# Patient Record
Sex: FEMALE | Race: WHITE | Employment: OTHER | URBAN - METROPOLITAN AREA
[De-identification: names, ages, dates, MRNs, and addresses within clinical notes are randomized per-mention and may not be internally consistent; named-entity substitution may affect disease eponyms.]

---

## 2022-01-01 ENCOUNTER — HOSPITAL ENCOUNTER (INPATIENT)
Age: 87
LOS: 2 days | DRG: 264 | End: 2022-12-05
Attending: EMERGENCY MEDICINE | Admitting: INTERNAL MEDICINE
Payer: MEDICARE

## 2022-01-01 ENCOUNTER — APPOINTMENT (OUTPATIENT)
Dept: GENERAL RADIOLOGY | Age: 87
DRG: 264 | End: 2022-01-01
Payer: MEDICARE

## 2022-01-01 VITALS
DIASTOLIC BLOOD PRESSURE: 37 MMHG | HEIGHT: 63 IN | OXYGEN SATURATION: 98 % | SYSTOLIC BLOOD PRESSURE: 82 MMHG | WEIGHT: 152.34 LBS | TEMPERATURE: 97.5 F | RESPIRATION RATE: 16 BRPM | HEART RATE: 76 BPM | BODY MASS INDEX: 26.99 KG/M2

## 2022-01-01 DIAGNOSIS — I50.9 CONGESTIVE HEART FAILURE, UNSPECIFIED HF CHRONICITY, UNSPECIFIED HEART FAILURE TYPE (HCC): ICD-10-CM

## 2022-01-01 DIAGNOSIS — R06.00 DYSPNEA, UNSPECIFIED TYPE: Primary | ICD-10-CM

## 2022-01-01 DIAGNOSIS — J81.0 ACUTE PULMONARY EDEMA (HCC): ICD-10-CM

## 2022-01-01 LAB
A/G RATIO: 1.1 (ref 1.1–2.2)
ALBUMIN SERPL-MCNC: 3.8 G/DL (ref 3.4–5)
ALP BLD-CCNC: 90 U/L (ref 40–129)
ALT SERPL-CCNC: <5 U/L (ref 10–40)
ANION GAP SERPL CALCULATED.3IONS-SCNC: 5 MMOL/L (ref 3–16)
ANION GAP SERPL CALCULATED.3IONS-SCNC: 7 MMOL/L (ref 3–16)
AST SERPL-CCNC: 14 U/L (ref 15–37)
BASOPHILS ABSOLUTE: 0 K/UL (ref 0–0.2)
BASOPHILS RELATIVE PERCENT: 0.3 %
BILIRUB SERPL-MCNC: 0.4 MG/DL (ref 0–1)
BUN BLDV-MCNC: 15 MG/DL (ref 7–20)
BUN BLDV-MCNC: 17 MG/DL (ref 7–20)
CALCIUM SERPL-MCNC: 8.6 MG/DL (ref 8.3–10.6)
CALCIUM SERPL-MCNC: 8.9 MG/DL (ref 8.3–10.6)
CHLORIDE BLD-SCNC: 95 MMOL/L (ref 99–110)
CHLORIDE BLD-SCNC: 97 MMOL/L (ref 99–110)
CHOLESTEROL, TOTAL: 153 MG/DL (ref 0–199)
CO2: 33 MMOL/L (ref 21–32)
CO2: 37 MMOL/L (ref 21–32)
CREAT SERPL-MCNC: 0.8 MG/DL (ref 0.6–1.2)
CREAT SERPL-MCNC: 0.9 MG/DL (ref 0.6–1.2)
EKG ATRIAL RATE: 70 BPM
EKG DIAGNOSIS: NORMAL
EKG P AXIS: 65 DEGREES
EKG P-R INTERVAL: 176 MS
EKG Q-T INTERVAL: 408 MS
EKG QRS DURATION: 82 MS
EKG QTC CALCULATION (BAZETT): 440 MS
EKG R AXIS: 76 DEGREES
EKG T AXIS: 63 DEGREES
EKG VENTRICULAR RATE: 70 BPM
EOSINOPHILS ABSOLUTE: 0 K/UL (ref 0–0.6)
EOSINOPHILS RELATIVE PERCENT: 0.4 %
GFR SERPL CREATININE-BSD FRML MDRD: 60 ML/MIN/{1.73_M2}
GFR SERPL CREATININE-BSD FRML MDRD: >60 ML/MIN/{1.73_M2}
GLUCOSE BLD-MCNC: 107 MG/DL (ref 70–99)
GLUCOSE BLD-MCNC: 110 MG/DL (ref 70–99)
HCT VFR BLD CALC: 37 % (ref 36–48)
HDLC SERPL-MCNC: 53 MG/DL (ref 40–60)
HEMOGLOBIN: 11.7 G/DL (ref 12–16)
LACTIC ACID, SEPSIS: 0.9 MMOL/L (ref 0.4–1.9)
LDL CHOLESTEROL CALCULATED: 88 MG/DL
LYMPHOCYTES ABSOLUTE: 0.9 K/UL (ref 1–5.1)
LYMPHOCYTES RELATIVE PERCENT: 8.2 %
MAGNESIUM: 1.9 MG/DL (ref 1.8–2.4)
MCH RBC QN AUTO: 28.6 PG (ref 26–34)
MCHC RBC AUTO-ENTMCNC: 31.5 G/DL (ref 31–36)
MCV RBC AUTO: 90.8 FL (ref 80–100)
MONOCYTES ABSOLUTE: 0.9 K/UL (ref 0–1.3)
MONOCYTES RELATIVE PERCENT: 8.4 %
NEUTROPHILS ABSOLUTE: 9.2 K/UL (ref 1.7–7.7)
NEUTROPHILS RELATIVE PERCENT: 82.7 %
PDW BLD-RTO: 14.5 % (ref 12.4–15.4)
PLATELET # BLD: 311 K/UL (ref 135–450)
PMV BLD AUTO: 6.5 FL (ref 5–10.5)
POTASSIUM SERPL-SCNC: 3.9 MMOL/L (ref 3.5–5.1)
POTASSIUM SERPL-SCNC: 4.3 MMOL/L (ref 3.5–5.1)
PRO-BNP: 4642 PG/ML (ref 0–449)
PROCALCITONIN: 0.08 NG/ML (ref 0–0.15)
RAPID INFLUENZA  B AGN: NEGATIVE
RAPID INFLUENZA A AGN: NEGATIVE
RBC # BLD: 4.08 M/UL (ref 4–5.2)
SARS-COV-2, NAAT: NOT DETECTED
SODIUM BLD-SCNC: 135 MMOL/L (ref 136–145)
SODIUM BLD-SCNC: 139 MMOL/L (ref 136–145)
TOTAL PROTEIN: 7.2 G/DL (ref 6.4–8.2)
TRIGL SERPL-MCNC: 60 MG/DL (ref 0–150)
TROPONIN: <0.01 NG/ML
VLDLC SERPL CALC-MCNC: 12 MG/DL
WBC # BLD: 11.1 K/UL (ref 4–11)

## 2022-01-01 PROCEDURE — 87635 SARS-COV-2 COVID-19 AMP PRB: CPT

## 2022-01-01 PROCEDURE — 0JBQ0ZZ EXCISION OF RIGHT FOOT SUBCUTANEOUS TISSUE AND FASCIA, OPEN APPROACH: ICD-10-PCS | Performed by: PODIATRIST

## 2022-01-01 PROCEDURE — 2500000003 HC RX 250 WO HCPCS: Performed by: NURSE PRACTITIONER

## 2022-01-01 PROCEDURE — 94761 N-INVAS EAR/PLS OXIMETRY MLT: CPT

## 2022-01-01 PROCEDURE — 80061 LIPID PANEL: CPT

## 2022-01-01 PROCEDURE — 6360000002 HC RX W HCPCS: Performed by: INTERNAL MEDICINE

## 2022-01-01 PROCEDURE — 99222 1ST HOSP IP/OBS MODERATE 55: CPT | Performed by: INTERNAL MEDICINE

## 2022-01-01 PROCEDURE — 96374 THER/PROPH/DIAG INJ IV PUSH: CPT

## 2022-01-01 PROCEDURE — 85025 COMPLETE CBC W/AUTO DIFF WBC: CPT

## 2022-01-01 PROCEDURE — 1200000000 HC SEMI PRIVATE

## 2022-01-01 PROCEDURE — 84145 PROCALCITONIN (PCT): CPT

## 2022-01-01 PROCEDURE — 6370000000 HC RX 637 (ALT 250 FOR IP): Performed by: INTERNAL MEDICINE

## 2022-01-01 PROCEDURE — 94640 AIRWAY INHALATION TREATMENT: CPT

## 2022-01-01 PROCEDURE — 80048 BASIC METABOLIC PNL TOTAL CA: CPT

## 2022-01-01 PROCEDURE — 0HBMXZZ EXCISION OF RIGHT FOOT SKIN, EXTERNAL APPROACH: ICD-10-PCS | Performed by: PODIATRIST

## 2022-01-01 PROCEDURE — 6360000002 HC RX W HCPCS: Performed by: EMERGENCY MEDICINE

## 2022-01-01 PROCEDURE — 92610 EVALUATE SWALLOWING FUNCTION: CPT

## 2022-01-01 PROCEDURE — 84484 ASSAY OF TROPONIN QUANT: CPT

## 2022-01-01 PROCEDURE — 99285 EMERGENCY DEPT VISIT HI MDM: CPT

## 2022-01-01 PROCEDURE — 93010 ELECTROCARDIOGRAM REPORT: CPT | Performed by: INTERNAL MEDICINE

## 2022-01-01 PROCEDURE — 87804 INFLUENZA ASSAY W/OPTIC: CPT

## 2022-01-01 PROCEDURE — 83880 ASSAY OF NATRIURETIC PEPTIDE: CPT

## 2022-01-01 PROCEDURE — 80053 COMPREHEN METABOLIC PANEL: CPT

## 2022-01-01 PROCEDURE — 71046 X-RAY EXAM CHEST 2 VIEWS: CPT

## 2022-01-01 PROCEDURE — 83605 ASSAY OF LACTIC ACID: CPT

## 2022-01-01 PROCEDURE — 6370000000 HC RX 637 (ALT 250 FOR IP): Performed by: EMERGENCY MEDICINE

## 2022-01-01 PROCEDURE — 2700000000 HC OXYGEN THERAPY PER DAY

## 2022-01-01 PROCEDURE — 36415 COLL VENOUS BLD VENIPUNCTURE: CPT

## 2022-01-01 PROCEDURE — 2580000003 HC RX 258: Performed by: INTERNAL MEDICINE

## 2022-01-01 PROCEDURE — 83735 ASSAY OF MAGNESIUM: CPT

## 2022-01-01 PROCEDURE — 93005 ELECTROCARDIOGRAM TRACING: CPT | Performed by: EMERGENCY MEDICINE

## 2022-01-01 PROCEDURE — 87040 BLOOD CULTURE FOR BACTERIA: CPT

## 2022-01-01 RX ORDER — IPRATROPIUM BROMIDE AND ALBUTEROL SULFATE 2.5; .5 MG/3ML; MG/3ML
1 SOLUTION RESPIRATORY (INHALATION) ONCE
Status: COMPLETED | OUTPATIENT
Start: 2022-01-01 | End: 2022-01-01

## 2022-01-01 RX ORDER — SODIUM CHLORIDE 0.9 % (FLUSH) 0.9 %
5-40 SYRINGE (ML) INJECTION EVERY 12 HOURS SCHEDULED
Status: DISCONTINUED | OUTPATIENT
Start: 2022-01-01 | End: 2022-01-01 | Stop reason: HOSPADM

## 2022-01-01 RX ORDER — MIRTAZAPINE 15 MG/1
15 TABLET, FILM COATED ORAL NIGHTLY
COMMUNITY

## 2022-01-01 RX ORDER — POTASSIUM CHLORIDE 20 MEQ/1
20 TABLET, EXTENDED RELEASE ORAL DAILY
COMMUNITY

## 2022-01-01 RX ORDER — ALBUTEROL SULFATE 2.5 MG/3ML
1.25 SOLUTION RESPIRATORY (INHALATION) EVERY 4 HOURS PRN
Status: DISCONTINUED | OUTPATIENT
Start: 2022-01-01 | End: 2022-01-01 | Stop reason: HOSPADM

## 2022-01-01 RX ORDER — OLANZAPINE 10 MG/1
5 INJECTION, POWDER, LYOPHILIZED, FOR SOLUTION INTRAMUSCULAR ONCE
Status: COMPLETED | OUTPATIENT
Start: 2022-01-01 | End: 2022-01-01

## 2022-01-01 RX ORDER — VALACYCLOVIR HYDROCHLORIDE 1 G/1
1000 TABLET, FILM COATED ORAL 3 TIMES DAILY
COMMUNITY
Start: 2022-01-01 | End: 2022-12-08

## 2022-01-01 RX ORDER — HYDROXYCHLOROQUINE SULFATE 200 MG/1
200 TABLET, FILM COATED ORAL DAILY
Status: DISCONTINUED | OUTPATIENT
Start: 2022-01-01 | End: 2022-01-01 | Stop reason: HOSPADM

## 2022-01-01 RX ORDER — SODIUM CHLORIDE 0.9 % (FLUSH) 0.9 %
5-40 SYRINGE (ML) INJECTION PRN
Status: DISCONTINUED | OUTPATIENT
Start: 2022-01-01 | End: 2022-01-01 | Stop reason: HOSPADM

## 2022-01-01 RX ORDER — DORZOLAMIDE HYDROCHLORIDE AND TIMOLOL MALEATE 20; 5 MG/ML; MG/ML
1 SOLUTION/ DROPS OPHTHALMIC 2 TIMES DAILY
Status: DISCONTINUED | OUTPATIENT
Start: 2022-01-01 | End: 2022-01-01 | Stop reason: HOSPADM

## 2022-01-01 RX ORDER — MIRTAZAPINE 15 MG/1
15 TABLET, FILM COATED ORAL NIGHTLY
Status: DISCONTINUED | OUTPATIENT
Start: 2022-01-01 | End: 2022-01-01 | Stop reason: HOSPADM

## 2022-01-01 RX ORDER — PREDNISOLONE ACETATE 10 MG/ML
1 SUSPENSION/ DROPS OPHTHALMIC 4 TIMES DAILY
Status: DISCONTINUED | OUTPATIENT
Start: 2022-01-01 | End: 2022-01-01 | Stop reason: HOSPADM

## 2022-01-01 RX ORDER — OMEPRAZOLE 20 MG/1
20 CAPSULE, DELAYED RELEASE ORAL 2 TIMES DAILY
COMMUNITY

## 2022-01-01 RX ORDER — BRIMONIDINE TARTRATE 2 MG/ML
1 SOLUTION/ DROPS OPHTHALMIC 3 TIMES DAILY
Status: DISCONTINUED | OUTPATIENT
Start: 2022-01-01 | End: 2022-01-01 | Stop reason: HOSPADM

## 2022-01-01 RX ORDER — ERYTHROMYCIN 5 MG/G
OINTMENT OPHTHALMIC 2 TIMES DAILY
COMMUNITY

## 2022-01-01 RX ORDER — OFLOXACIN 3 MG/ML
1 SOLUTION/ DROPS OPHTHALMIC 4 TIMES DAILY
Status: DISCONTINUED | OUTPATIENT
Start: 2022-01-01 | End: 2022-01-01 | Stop reason: CLARIF

## 2022-01-01 RX ORDER — ONDANSETRON 4 MG/1
4 TABLET, ORALLY DISINTEGRATING ORAL EVERY 8 HOURS PRN
Status: DISCONTINUED | OUTPATIENT
Start: 2022-01-01 | End: 2022-01-01 | Stop reason: HOSPADM

## 2022-01-01 RX ORDER — SODIUM CHLORIDE 9 MG/ML
INJECTION, SOLUTION INTRAVENOUS PRN
Status: DISCONTINUED | OUTPATIENT
Start: 2022-01-01 | End: 2022-01-01 | Stop reason: HOSPADM

## 2022-01-01 RX ORDER — OFLOXACIN 3 MG/ML
1 SOLUTION/ DROPS OPHTHALMIC 4 TIMES DAILY
COMMUNITY

## 2022-01-01 RX ORDER — VALACYCLOVIR HYDROCHLORIDE 500 MG/1
1000 TABLET, FILM COATED ORAL 2 TIMES DAILY
Status: DISCONTINUED | OUTPATIENT
Start: 2022-01-01 | End: 2022-01-01 | Stop reason: HOSPADM

## 2022-01-01 RX ORDER — CALCIUM CARBONATE 500(1250)
500 TABLET ORAL DAILY
Status: DISCONTINUED | OUTPATIENT
Start: 2022-01-01 | End: 2022-01-01 | Stop reason: HOSPADM

## 2022-01-01 RX ORDER — BUSPIRONE HYDROCHLORIDE 7.5 MG/1
7.5 TABLET ORAL 3 TIMES DAILY
COMMUNITY

## 2022-01-01 RX ORDER — FUROSEMIDE 10 MG/ML
40 INJECTION INTRAMUSCULAR; INTRAVENOUS ONCE
Status: COMPLETED | OUTPATIENT
Start: 2022-01-01 | End: 2022-01-01

## 2022-01-01 RX ORDER — FUROSEMIDE 10 MG/ML
40 INJECTION INTRAMUSCULAR; INTRAVENOUS DAILY
Status: DISCONTINUED | OUTPATIENT
Start: 2022-01-01 | End: 2022-01-01

## 2022-01-01 RX ORDER — ACETAMINOPHEN 650 MG/1
650 SUPPOSITORY RECTAL EVERY 6 HOURS PRN
Status: DISCONTINUED | OUTPATIENT
Start: 2022-01-01 | End: 2022-01-01 | Stop reason: HOSPADM

## 2022-01-01 RX ORDER — FUROSEMIDE 20 MG/1
20 TABLET ORAL EVERY OTHER DAY
COMMUNITY

## 2022-01-01 RX ORDER — ALBUTEROL SULFATE 90 UG/1
2 AEROSOL, METERED RESPIRATORY (INHALATION) 2 TIMES DAILY
Status: DISCONTINUED | OUTPATIENT
Start: 2022-01-01 | End: 2022-01-01 | Stop reason: HOSPADM

## 2022-01-01 RX ORDER — POLYETHYLENE GLYCOL 3350 17 G/17G
17 POWDER, FOR SOLUTION ORAL DAILY PRN
Status: DISCONTINUED | OUTPATIENT
Start: 2022-01-01 | End: 2022-01-01 | Stop reason: HOSPADM

## 2022-01-01 RX ORDER — ASPIRIN 81 MG/1
81 TABLET, CHEWABLE ORAL DAILY
Status: DISCONTINUED | OUTPATIENT
Start: 2022-01-01 | End: 2022-01-01 | Stop reason: HOSPADM

## 2022-01-01 RX ORDER — PREDNISOLONE ACETATE 10 MG/ML
1 SUSPENSION/ DROPS OPHTHALMIC 4 TIMES DAILY
COMMUNITY

## 2022-01-01 RX ORDER — BUSPIRONE HYDROCHLORIDE 5 MG/1
5 TABLET ORAL 2 TIMES DAILY
Status: CANCELLED | OUTPATIENT
Start: 2022-01-01

## 2022-01-01 RX ORDER — IPRATROPIUM BROMIDE AND ALBUTEROL SULFATE 2.5; .5 MG/3ML; MG/3ML
1 SOLUTION RESPIRATORY (INHALATION)
Status: DISCONTINUED | OUTPATIENT
Start: 2022-01-01 | End: 2022-01-01

## 2022-01-01 RX ORDER — ONDANSETRON 2 MG/ML
4 INJECTION INTRAMUSCULAR; INTRAVENOUS EVERY 6 HOURS PRN
Status: DISCONTINUED | OUTPATIENT
Start: 2022-01-01 | End: 2022-01-01 | Stop reason: HOSPADM

## 2022-01-01 RX ORDER — FUROSEMIDE 10 MG/ML
40 INJECTION INTRAMUSCULAR; INTRAVENOUS 2 TIMES DAILY
Status: DISCONTINUED | OUTPATIENT
Start: 2022-01-01 | End: 2022-01-01 | Stop reason: HOSPADM

## 2022-01-01 RX ORDER — BRIMONIDINE TARTRATE 0.15 %
1 DROPS OPHTHALMIC (EYE) EVERY 8 HOURS
COMMUNITY

## 2022-01-01 RX ORDER — PANTOPRAZOLE SODIUM 40 MG/1
40 TABLET, DELAYED RELEASE ORAL
Status: DISCONTINUED | OUTPATIENT
Start: 2022-01-01 | End: 2022-01-01 | Stop reason: HOSPADM

## 2022-01-01 RX ORDER — BUSPIRONE HYDROCHLORIDE 5 MG/1
7.5 TABLET ORAL 3 TIMES DAILY
Status: DISCONTINUED | OUTPATIENT
Start: 2022-01-01 | End: 2022-01-01 | Stop reason: HOSPADM

## 2022-01-01 RX ORDER — ACETAMINOPHEN 325 MG/1
650 TABLET ORAL EVERY 6 HOURS PRN
Status: DISCONTINUED | OUTPATIENT
Start: 2022-01-01 | End: 2022-01-01 | Stop reason: HOSPADM

## 2022-01-01 RX ORDER — ERYTHROMYCIN 5 MG/G
OINTMENT OPHTHALMIC 2 TIMES DAILY
Status: DISCONTINUED | OUTPATIENT
Start: 2022-01-01 | End: 2022-01-01 | Stop reason: HOSPADM

## 2022-01-01 RX ORDER — DORZOLAMIDE HYDROCHLORIDE AND TIMOLOL MALEATE 20; 5 MG/ML; MG/ML
1 SOLUTION/ DROPS OPHTHALMIC 2 TIMES DAILY
COMMUNITY

## 2022-01-01 RX ORDER — CIPROFLOXACIN HYDROCHLORIDE 3.5 MG/ML
1 SOLUTION/ DROPS TOPICAL
Status: DISCONTINUED | OUTPATIENT
Start: 2022-01-01 | End: 2022-01-01 | Stop reason: HOSPADM

## 2022-01-01 RX ADMIN — Medication 2 PUFF: at 07:28

## 2022-01-01 RX ADMIN — VALACYCLOVIR HYDROCHLORIDE 1000 MG: 500 TABLET, FILM COATED ORAL at 21:19

## 2022-01-01 RX ADMIN — FUROSEMIDE 40 MG: 10 INJECTION, SOLUTION INTRAMUSCULAR; INTRAVENOUS at 13:18

## 2022-01-01 RX ADMIN — Medication 2 PUFF: at 19:20

## 2022-01-01 RX ADMIN — DORZOLAMIDE HYDROCHLORIDE AND TIMOLOL MALEATE 1 DROP: 20; 5 SOLUTION/ DROPS OPHTHALMIC at 21:00

## 2022-01-01 RX ADMIN — Medication 2 PUFF: at 20:50

## 2022-01-01 RX ADMIN — METOPROLOL TARTRATE 25 MG: 25 TABLET, FILM COATED ORAL at 21:19

## 2022-01-01 RX ADMIN — ONDANSETRON 4 MG: 2 INJECTION INTRAMUSCULAR; INTRAVENOUS at 18:15

## 2022-01-01 RX ADMIN — BUSPIRONE HYDROCHLORIDE 7.5 MG: 5 TABLET ORAL at 13:27

## 2022-01-01 RX ADMIN — ERYTHROMYCIN: 5 OINTMENT OPHTHALMIC at 21:08

## 2022-01-01 RX ADMIN — ERYTHROMYCIN: 5 OINTMENT OPHTHALMIC at 21:24

## 2022-01-01 RX ADMIN — BRIMONIDINE TARTRATE 1 DROP: 2 SOLUTION OPHTHALMIC at 18:00

## 2022-01-01 RX ADMIN — ACETAMINOPHEN 650 MG: 325 TABLET ORAL at 13:30

## 2022-01-01 RX ADMIN — BRIMONIDINE TARTRATE 1 DROP: 2 SOLUTION OPHTHALMIC at 08:32

## 2022-01-01 RX ADMIN — APIXABAN 5 MG: 5 TABLET, FILM COATED ORAL at 21:19

## 2022-01-01 RX ADMIN — METOPROLOL TARTRATE 25 MG: 25 TABLET, FILM COATED ORAL at 08:33

## 2022-01-01 RX ADMIN — CIPROFLOXACIN 1 DROP: 3 SOLUTION OPHTHALMIC at 18:01

## 2022-01-01 RX ADMIN — BUSPIRONE HYDROCHLORIDE 7.5 MG: 5 TABLET ORAL at 20:54

## 2022-01-01 RX ADMIN — BRIMONIDINE TARTRATE 1 DROP: 2 SOLUTION OPHTHALMIC at 21:21

## 2022-01-01 RX ADMIN — IPRATROPIUM BROMIDE AND ALBUTEROL SULFATE 1 AMPULE: 2.5; .5 SOLUTION RESPIRATORY (INHALATION) at 12:12

## 2022-01-01 RX ADMIN — BRIMONIDINE TARTRATE 1 DROP: 2 SOLUTION OPHTHALMIC at 14:30

## 2022-01-01 RX ADMIN — VALACYCLOVIR HYDROCHLORIDE 1000 MG: 500 TABLET, FILM COATED ORAL at 09:17

## 2022-01-01 RX ADMIN — DORZOLAMIDE HYDROCHLORIDE AND TIMOLOL MALEATE 1 DROP: 20; 5 SOLUTION/ DROPS OPHTHALMIC at 08:42

## 2022-01-01 RX ADMIN — PREDNISOLONE ACETATE 1 DROP: 10 SUSPENSION/ DROPS OPHTHALMIC at 21:17

## 2022-01-01 RX ADMIN — CIPROFLOXACIN 1 DROP: 3 SOLUTION OPHTHALMIC at 21:41

## 2022-01-01 RX ADMIN — PANTOPRAZOLE SODIUM 40 MG: 40 TABLET, DELAYED RELEASE ORAL at 16:39

## 2022-01-01 RX ADMIN — PREDNISOLONE ACETATE 1 DROP: 10 SUSPENSION/ DROPS OPHTHALMIC at 17:25

## 2022-01-01 RX ADMIN — Medication 10 ML: at 20:55

## 2022-01-01 RX ADMIN — CIPROFLOXACIN 1 DROP: 3 SOLUTION OPHTHALMIC at 10:20

## 2022-01-01 RX ADMIN — PREDNISOLONE ACETATE 1 DROP: 10 SUSPENSION/ DROPS OPHTHALMIC at 13:23

## 2022-01-01 RX ADMIN — CIPROFLOXACIN 1 DROP: 3 SOLUTION OPHTHALMIC at 20:56

## 2022-01-01 RX ADMIN — ERYTHROMYCIN: 5 OINTMENT OPHTHALMIC at 10:20

## 2022-01-01 RX ADMIN — APIXABAN 5 MG: 5 TABLET, FILM COATED ORAL at 20:54

## 2022-01-01 RX ADMIN — PANTOPRAZOLE SODIUM 40 MG: 40 TABLET, DELAYED RELEASE ORAL at 06:10

## 2022-01-01 RX ADMIN — ASPIRIN 81 MG: 81 TABLET, CHEWABLE ORAL at 08:33

## 2022-01-01 RX ADMIN — CIPROFLOXACIN 1 DROP: 3 SOLUTION OPHTHALMIC at 06:10

## 2022-01-01 RX ADMIN — BUSPIRONE HYDROCHLORIDE 7.5 MG: 5 TABLET ORAL at 21:18

## 2022-01-01 RX ADMIN — OLANZAPINE 5 MG: 10 INJECTION, POWDER, FOR SOLUTION INTRAMUSCULAR at 02:29

## 2022-01-01 RX ADMIN — CALCIUM 500 MG: 500 TABLET ORAL at 08:33

## 2022-01-01 RX ADMIN — METOPROLOL TARTRATE 25 MG: 25 TABLET, FILM COATED ORAL at 20:54

## 2022-01-01 RX ADMIN — PREDNISOLONE ACETATE 1 DROP: 10 SUSPENSION/ DROPS OPHTHALMIC at 09:17

## 2022-01-01 RX ADMIN — FUROSEMIDE 40 MG: 10 INJECTION, SOLUTION INTRAMUSCULAR; INTRAVENOUS at 17:22

## 2022-01-01 RX ADMIN — VALACYCLOVIR HYDROCHLORIDE 1000 MG: 500 TABLET, FILM COATED ORAL at 21:06

## 2022-01-01 RX ADMIN — Medication 10 ML: at 21:23

## 2022-01-01 RX ADMIN — CIPROFLOXACIN 1 DROP: 3 SOLUTION OPHTHALMIC at 13:23

## 2022-01-01 RX ADMIN — Medication 10 ML: at 08:45

## 2022-01-01 RX ADMIN — Medication 2 PUFF: at 19:16

## 2022-01-01 RX ADMIN — BUSPIRONE HYDROCHLORIDE 7.5 MG: 5 TABLET ORAL at 16:39

## 2022-01-01 RX ADMIN — DORZOLAMIDE HYDROCHLORIDE AND TIMOLOL MALEATE 1 DROP: 20; 5 SOLUTION/ DROPS OPHTHALMIC at 21:08

## 2022-01-01 RX ADMIN — MIRTAZAPINE 15 MG: 15 TABLET, FILM COATED ORAL at 20:55

## 2022-01-01 RX ADMIN — CIPROFLOXACIN 1 DROP: 3 SOLUTION OPHTHALMIC at 18:09

## 2022-01-01 RX ADMIN — PANTOPRAZOLE SODIUM 40 MG: 40 TABLET, DELAYED RELEASE ORAL at 16:10

## 2022-01-01 RX ADMIN — FUROSEMIDE 40 MG: 10 INJECTION, SOLUTION INTRAMUSCULAR; INTRAVENOUS at 08:33

## 2022-01-01 RX ADMIN — APIXABAN 5 MG: 5 TABLET, FILM COATED ORAL at 08:33

## 2022-01-01 RX ADMIN — HYDROXYCHLOROQUINE SULFATE 200 MG: 200 TABLET ORAL at 08:33

## 2022-01-01 RX ADMIN — BUSPIRONE HYDROCHLORIDE 7.5 MG: 5 TABLET ORAL at 08:33

## 2022-01-01 RX ADMIN — Medication 2 PUFF: at 20:52

## 2022-01-01 RX ADMIN — MIRTAZAPINE 15 MG: 15 TABLET, FILM COATED ORAL at 21:19

## 2022-01-01 RX ADMIN — TIOTROPIUM BROMIDE INHALATION SPRAY 2 PUFF: 3.12 SPRAY, METERED RESPIRATORY (INHALATION) at 07:28

## 2022-01-01 RX ADMIN — BRIMONIDINE TARTRATE 1 DROP: 2 SOLUTION OPHTHALMIC at 20:56

## 2022-01-01 ASSESSMENT — ENCOUNTER SYMPTOMS
RECTAL PAIN: 0
ABDOMINAL DISTENTION: 0
APNEA: 0
EYE DISCHARGE: 0
SHORTNESS OF BREATH: 1
VOICE CHANGE: 0
COUGH: 0
RHINORRHEA: 0
PHOTOPHOBIA: 0
BLOOD IN STOOL: 0
NAUSEA: 0
VOMITING: 0
ABDOMINAL PAIN: 0
COLOR CHANGE: 0
CONSTIPATION: 0
CHEST TIGHTNESS: 0
BACK PAIN: 0
SINUS PRESSURE: 0
WHEEZING: 0
DIARRHEA: 0
TROUBLE SWALLOWING: 0
EYE PAIN: 0
EYE REDNESS: 0
SORE THROAT: 0
STRIDOR: 0

## 2022-04-22 ENCOUNTER — HOSPITAL ENCOUNTER (INPATIENT)
Age: 87
LOS: 2 days | Discharge: HOME HEALTH | DRG: 684 | End: 2022-04-25
Attending: EMERGENCY MEDICINE | Admitting: INTERNAL MEDICINE
Payer: MEDICARE

## 2022-04-22 ENCOUNTER — APPOINTMENT (OUTPATIENT)
Dept: GENERAL RADIOLOGY | Age: 87
DRG: 684 | End: 2022-04-22
Payer: MEDICARE

## 2022-04-22 DIAGNOSIS — J96.01 ACUTE RESPIRATORY FAILURE WITH HYPOXIA (HCC): Primary | ICD-10-CM

## 2022-04-22 DIAGNOSIS — J90 PLEURAL EFFUSION: ICD-10-CM

## 2022-04-22 DIAGNOSIS — R79.89 ELEVATED D-DIMER: ICD-10-CM

## 2022-04-22 DIAGNOSIS — N17.9 AKI (ACUTE KIDNEY INJURY) (HCC): ICD-10-CM

## 2022-04-22 DIAGNOSIS — S70.02XA CONTUSION OF LEFT HIP, INITIAL ENCOUNTER: ICD-10-CM

## 2022-04-22 LAB
A/G RATIO: 1.6 (ref 1.1–2.2)
ALBUMIN SERPL-MCNC: 3.9 G/DL (ref 3.4–5)
ALP BLD-CCNC: 69 U/L (ref 40–129)
ALT SERPL-CCNC: 9 U/L (ref 10–40)
ANION GAP SERPL CALCULATED.3IONS-SCNC: 11 MMOL/L (ref 3–16)
AST SERPL-CCNC: 14 U/L (ref 15–37)
BASE EXCESS VENOUS: 2.8 MMOL/L (ref -3–3)
BASOPHILS ABSOLUTE: 0 K/UL (ref 0–0.2)
BASOPHILS RELATIVE PERCENT: 0.3 %
BILIRUB SERPL-MCNC: 0.4 MG/DL (ref 0–1)
BILIRUBIN URINE: NEGATIVE
BLOOD, URINE: NEGATIVE
BUN BLDV-MCNC: 40 MG/DL (ref 7–20)
CALCIUM SERPL-MCNC: 9.2 MG/DL (ref 8.3–10.6)
CARBOXYHEMOGLOBIN: 1.8 % (ref 0–1.5)
CHLORIDE BLD-SCNC: 97 MMOL/L (ref 99–110)
CLARITY: CLEAR
CO2: 30 MMOL/L (ref 21–32)
COLOR: YELLOW
CREAT SERPL-MCNC: 1.8 MG/DL (ref 0.6–1.2)
EOSINOPHILS ABSOLUTE: 0.2 K/UL (ref 0–0.6)
EOSINOPHILS RELATIVE PERCENT: 1.9 %
GFR AFRICAN AMERICAN: 32
GFR NON-AFRICAN AMERICAN: 26
GLUCOSE BLD-MCNC: 102 MG/DL (ref 70–99)
GLUCOSE URINE: NEGATIVE MG/DL
HCO3 VENOUS: 27.6 MMOL/L (ref 23–29)
HCT VFR BLD CALC: 36.2 % (ref 36–48)
HEMOGLOBIN: 12.2 G/DL (ref 12–16)
KETONES, URINE: NEGATIVE MG/DL
LEUKOCYTE ESTERASE, URINE: NEGATIVE
LYMPHOCYTES ABSOLUTE: 0.9 K/UL (ref 1–5.1)
LYMPHOCYTES RELATIVE PERCENT: 10.6 %
MCH RBC QN AUTO: 29.7 PG (ref 26–34)
MCHC RBC AUTO-ENTMCNC: 33.6 G/DL (ref 31–36)
MCV RBC AUTO: 88.3 FL (ref 80–100)
METHEMOGLOBIN VENOUS: 0.3 %
MICROSCOPIC EXAMINATION: NORMAL
MONOCYTES ABSOLUTE: 1 K/UL (ref 0–1.3)
MONOCYTES RELATIVE PERCENT: 11.7 %
NEUTROPHILS ABSOLUTE: 6.4 K/UL (ref 1.7–7.7)
NEUTROPHILS RELATIVE PERCENT: 75.5 %
NITRITE, URINE: NEGATIVE
O2 SAT, VEN: 99 %
O2 THERAPY: ABNORMAL
PCO2, VEN: 43.6 MMHG (ref 40–50)
PDW BLD-RTO: 14.6 % (ref 12.4–15.4)
PH UA: 6 (ref 5–8)
PH VENOUS: 7.42 (ref 7.35–7.45)
PLATELET # BLD: 172 K/UL (ref 135–450)
PMV BLD AUTO: 6.7 FL (ref 5–10.5)
PO2, VEN: 134.6 MMHG (ref 25–40)
POTASSIUM REFLEX MAGNESIUM: 4.6 MMOL/L (ref 3.5–5.1)
PRO-BNP: 9235 PG/ML (ref 0–449)
PROTEIN UA: NEGATIVE MG/DL
RBC # BLD: 4.09 M/UL (ref 4–5.2)
SODIUM BLD-SCNC: 138 MMOL/L (ref 136–145)
SPECIFIC GRAVITY UA: 1.01 (ref 1–1.03)
TCO2 CALC VENOUS: 29 MMOL/L
TOTAL PROTEIN: 6.4 G/DL (ref 6.4–8.2)
TROPONIN: <0.01 NG/ML
URINE REFLEX TO CULTURE: NORMAL
URINE TYPE: NORMAL
UROBILINOGEN, URINE: 0.2 E.U./DL
WBC # BLD: 8.5 K/UL (ref 4–11)

## 2022-04-22 PROCEDURE — 87636 SARSCOV2 & INF A&B AMP PRB: CPT

## 2022-04-22 PROCEDURE — 80053 COMPREHEN METABOLIC PANEL: CPT

## 2022-04-22 PROCEDURE — 83880 ASSAY OF NATRIURETIC PEPTIDE: CPT

## 2022-04-22 PROCEDURE — 81003 URINALYSIS AUTO W/O SCOPE: CPT

## 2022-04-22 PROCEDURE — 85025 COMPLETE CBC W/AUTO DIFF WBC: CPT

## 2022-04-22 PROCEDURE — 71046 X-RAY EXAM CHEST 2 VIEWS: CPT

## 2022-04-22 PROCEDURE — 36415 COLL VENOUS BLD VENIPUNCTURE: CPT

## 2022-04-22 PROCEDURE — 93005 ELECTROCARDIOGRAM TRACING: CPT | Performed by: PHYSICIAN ASSISTANT

## 2022-04-22 PROCEDURE — 73502 X-RAY EXAM HIP UNI 2-3 VIEWS: CPT

## 2022-04-22 PROCEDURE — 99285 EMERGENCY DEPT VISIT HI MDM: CPT

## 2022-04-22 PROCEDURE — 82803 BLOOD GASES ANY COMBINATION: CPT

## 2022-04-22 PROCEDURE — 84484 ASSAY OF TROPONIN QUANT: CPT

## 2022-04-22 RX ORDER — FUROSEMIDE 40 MG/1
40 TABLET ORAL DAILY
COMMUNITY

## 2022-04-22 ASSESSMENT — PAIN - FUNCTIONAL ASSESSMENT: PAIN_FUNCTIONAL_ASSESSMENT: NONE - DENIES PAIN

## 2022-04-22 ASSESSMENT — LIFESTYLE VARIABLES
HOW OFTEN DO YOU HAVE A DRINK CONTAINING ALCOHOL: NEVER
HOW MANY STANDARD DRINKS CONTAINING ALCOHOL DO YOU HAVE ON A TYPICAL DAY: 1 OR 2

## 2022-04-22 NOTE — ED PROVIDER NOTES
[address] ED  EMERGENCY DEPARTMENT ENCOUNTER        Pt Name: Denton Mena  MRN: [id number]  [place] 12/12/1930  Date of evaluation: 4/22/2022  Provider: SANKET Tena  PCP: Jabier Virgen    This patient was seen and evaluated by the attending physician Gogo Radford [address]       Chief Complaint   Patient presents with    Fall     pain in left Hip when walks or move after a fall. HISTORY OF PRESENT ILLNESS   (Location/Symptom, Timing/Onset, Context/Setting, Quality, Duration, Modifying Factors, Severity)  Note limiting factors. Denton Mena is a 80 y.o. female history of arthritis, atrial fibrillation, COPD, hypertension who presents via EMS from her skilled nursing facility for evaluation after a fall. Patient notes that she was at her skilled nursing facility in her room, she was sorting through a delivery that she had just gotten to her room and she lost her footing and fell back onto her bottom. She denies hitting her head no loss of consciousness. She notes that since then she has had pain with weightbearing to the left hip. She denies distal numbness tingling or weakness. She denies chest pain. She notes chronic obstructive pulmonary disease at with worsening shortness of breath over the last week. She had chest x-ray yesterday and she notes that it was clear. She does not typically wear oxygen but currently is requiring 2 L nasal cannula. She endorses being compliant with all of her medications. She notes chronic lower extremity edema that is not worse than typical.  She denies any nausea vomiting or abdominal pain and has no other acute concerns or complaints today. She is had acetaminophen for her hip pain with improvement but not complete resolution. Nursing Notes were all reviewed and agreed with or any disagreements were addressed  in the HPI. Pt was seen during the [place] 19 pandemic.  Appropriate PPE worn by ME during patient encounters. Pt seen during a time with constrained hospital bed capacity and other potential inpatient and outpatient resources were constrained due to the viral pandemic. REVIEW OF SYSTEMS    (2-9 systems for level 4, 10 or more for level 5)     Review of Systems    Positives and Pertinent negatives as per HPI. Except as noted abovein the ROS, all other systems were reviewed and negative. PAST MEDICAL HISTORY     Past Medical History:   Diagnosis Date    Arthritis     [facility]     COPD (chronic obstructive pulmonary disease) ([facility] [facility]     Hypertension          SURGICAL HISTORY   No past surgical history on file. CURRENTMEDICATIONS       Previous Medications    ASPIRIN 81 MG CHEWABLE TABLET    Take 81 mg by mouth daily    CALCIUM CARBONATE (OSCAL) 500 MG TABS TABLET    Take 500 mg by mouth daily    HYDROXYCHLOROQUINE (PLAQUENIL) 200 MG TABLET    Take by mouth daily    MELOXICAM (MOBIC) 15 MG TABLET    Take 15 mg by mouth daily    METOPROLOL TARTRATE (LOPRESSOR) 50 MG TABLET    Take 50 mg by mouth 2 times daily    POTASSIUM CHLORIDE (KLOR-CON) 20 MEQ PACKET    Take 20 mEq by mouth 2 times daily         ALLERGIES     Sulfa antibiotics    FAMILYHISTORY     No family history on file. SOCIAL HISTORY       Social History     Socioeconomic History    Marital status:       Spouse name: Not on file    Number of children: Not on file    Years of education: Not on file    Highest education level: Not on file   Occupational History    Not on file   Tobacco Use    Smoking status: Never Smoker    Smokeless tobacco: Not on file   Substance and Sexual Activity    Alcohol use: No    Drug use: Not on file    Sexual activity: Not on file   Other Topics Concern    Not on file   Social History Narrative    Not on file     Social Determinants of Health     Financial Resource Strain:     Difficulty of Paying Living Expenses: Not on file   Food Insecurity:     Worried About [facility] in the Last Year: Not on file    Jamison of Food in the Last Year: Not on file   Transportation Needs:     Lack of Transportation (Medical): Not on file    Lack of Transportation (Non-Medical): Not on file   Physical Activity:     Days of Exercise per Week: Not on file    Minutes of Exercise per Session: Not on file   Stress:     Feeling of Stress : Not on file   Social Connections:     Frequency of Communication with Friends and Family: Not on file    Frequency of Social Gatherings with Friends and Family: Not on file    Attends Pent[employer]al Services: Not on file    Active Member of [address] [place] [address] or Organizations: Not on file    Attends Club or Organization Meetings: Not on file    Marital Status: Not on file   Intimate Partner Violence:     Fear of Current or Ex-Partner: Not on file    Emotionally Abused: Not on file    Physically Abused: Not on file    Sexually Abused: Not on file   Housing Stability:     Unable to Pay for Housing in the Last Year: Not on file    Number of [place] in the Last Year: Not on file    Unstable Housing in the Last Year: Not on file       SCREENINGS             PHYSICAL EXAM    (up to 7 for level 4, 8 or more for level 5)     ED Triage Vitals   BP Temp Temp src Pulse Resp SpO2 Height Weight   -- -- -- -- -- -- -- --       Physical Exam  PHYSICAL EXAM  BP (!) 120/49   Pulse 55   Temp 97.8 °F (36.6 °C) (Oral)   Resp 16   Ht 5' 3\" (1.6 m)   Wt 156 lb (70.8 kg)   SpO2 99%   BMI 27.63 kg/m²   GENERAL APPEARANCE: Awake and alert. Cooperative. Adult distress. Elderly female sitting actively in exam room, she is nondiaphoretic, breathing on room air satting 86-89%, complaining of mild shortness of breath that has been chronic for her, she has no tachypnea no conversational dyspnea, no accessory muscle use no other sign of acute respiratory distress. She is seen and evaluated in room 11  HEAD: Normocephalic. Atraumatic. EYES: PERRL. EOM's grossly intact. ENT: Mucous membranes are. Oropharynx nonerythematous nonedematous uvula midline. No raccoon eyes no hemotympanum  NECK: Supple. No midline tenderness to palpation. Back: Section of the cervical thoracolumbosacral spine reveals overall good bony alignment no gross deformity. .  There is no midline tenderness over the bony spine. HEART: RRR. No murmurs. Radial pulses 2+ symmetric PT pulses plus, symmetric. LUNGS: Respirations unlabored. CTAB. Good air exchange. Speaking comfortably in full sentences. ABDOMEN: Soft. Non-distended. Non-tender. No masses. No organomegaly. No guarding or rebound. EXTREMITIES: No peripheral edema. Moves all extremities equally. All extremities neurovascularly intact. Able to lift bilateral lower extremities off the bed. No pain with logroll. There is some tenderness to palpation over the greater trochanter on the left side with no overlying soft tissue swelling ecchymosis or other sign of deformity. Dorsiflexion plantarflexion strength intact and symmetric. SKIN: Warm and dry. No acute rashes. NEUROLOGICAL: Alert and oriented x 4.  CN grossly intact. No gross facial drooping. Power intact to UE and LE, sensation intact x 4. No tremors or ataxia. PSYCHIATRIC: Normal mood and affect. DIAGNOSTIC RESULTS   LABS:    Labs Reviewed - No data to display    All other labs were within normal range or not returned as of this dictation. EKG: All EKG's are interpreted by the Emergency Department Physician who either signs orCo-signs this chart in the absence of a cardiologist.  Please see their note for interpretation of EKG.       RADIOLOGY:   Non-plain film images such as CT, Ultrasound and MRI are read by the radiologist. 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 XR CHEST (2 VW)   Final Result   Trace bilateral pleural effusions. No results found. PROCEDURES   Unless otherwise noted below, none     Procedures    CRITICAL CARE TIME   I personally saw the patient and independently provided 15 minutes of non-concurrent critical care out of the total shared critical care time provided. This excludes time spent doing separately billable procedures. This includes time at the bedside, data interpretation, medication management, obtaining critical history from collateral sources if the patient is unable to provide it directly, and physician consultation. Specifics of interventions taken and potentially life-threatening diagnostic considerations are listed above in the medical decision making. If this was a shared visit with a Physician the time in this attestation is non-concurrent critical care time out of the total shared critical care time provided by the Physician and myself. CONSULTS:  None      EMERGENCY DEPARTMENT COURSE and DIFFERENTIALDIAGNOSIS/MDM:   Vitals:    Vitals:    04/22/22 2100 04/22/22 2230 04/22/22 2300 04/23/22 0000   BP: (!) 173/71 (!) 151/74 (!) 122/105 (!) 120/49   Pulse:    55   Resp:       Temp:       TempSrc:       SpO2:  99% (!) 83% 99%   Weight:       Height:           Patient was given thefollowing medications:  Medications   ipratropium-albuterol (DUONEB) nebulizer solution 1 ampule (1 ampule Inhalation Given 4/23/22 0042)       PDMP Monitoring:    Last PDMP [place] as Reviewed [facility]):  Review User Review Instant Review Result            Urine Drug Screenings (1 yr)    No resulted procedures found. Medication Contract and Consent for Opioid Use Documents Filed      No documents found                MDM:   Patient seen and evaluated. Old records reviewed. Diagnostic testing reviewed and results discussed.       26-year-old female presents for evaluation of hip pain after a fall. Work-up included blood work and imaging. X-ray of her hip was obtained which was negative for acute fracture dislocation. She is mobile at the hip, I believe she is likely suffering from a contusion injury versus other soft tissue injury. Cannot rule out labral tear. Low mechanism of injury, reasonable for her to follow-up as an outpatient with her hip pain. I have no concern for syncope, she endorses a mechanical mechanism of injury. Patient when I was examining her was slightly acutely hypoxic, I put her on nasal cannula and we were unable to wean her off of nasal cannula. She is not complaining of chest pain not complaining of shortness of breath has no increased work of breathing. She has chronic history of COPD however from my examination she is not wheezing. Blood work and chest x-ray and EKG were obtained. CBC is normal.  Metabolic panel reveals slightly worsening renal function, blood work reviewed from outside hospital October 2021 BUN was 34 creatinine was 1.49, slightly worse today. Troponin is negative. BNP is elevated, 9235 and she does have evidence of trace pleural effusions on chest x-ray. Concern for CHF versus PE. Twelve-lead reveals normal sinus rhythm with questionable left atrial enlargement, no other significant acute abnormalities appreciated. D-dimer obtained, pending at time of signout. Patient stable on 2 L nasal cannula at time of signout however patient will require admission for her acute respiratory failure with hypoxia. She has not had an echo in several years, it may be beneficial to repeat an echocardiogram and ensure no worsening structural heart changes. Given her JENS, and the fact that she is stable I believe it may be best to defer CT PA until she has been hydrated or obtain VQ scan for confirmation diagnosis.     D dimer is positive, discussed contraindications of anticoag with patient and there is none, will proceed with heparin and page hosptialist.       Addendum: Hospitalist requesting subcu heparin until confirmed diagnosis, I do believe that this is reasonable in 80year-old. Heparin drip removed. FINAL IMPRESSION      1. Acute respiratory failure with hypoxia ([address]    2. Contusion of left hip, initial encounter    3. JENS (acute kidney injury) ([facility] [address]    4. Pleural effusion          DISPOSITION/PLAN   DISPOSITION        PATIENT REFERREDTO:  No follow-up provider specified.     DISCHARGE MEDICATIONS:  New Prescriptions    No medications on file       DISCONTINUED MEDICATIONS:  Discontinued Medications    No medications on file              (Please note that portions ofthis note were completed with a voice recognition program.  Efforts were made to edit the dictations but occasionally words are mis-transcribed.)    Linh Friedman, [address] (electronically signed)        Linh Friedman, [address]  04/23/22 [address], [address]  04/23/22 [address], [address]  04/23/22 0236

## 2022-04-23 ENCOUNTER — APPOINTMENT (OUTPATIENT)
Dept: NUCLEAR MEDICINE | Age: 87
DRG: 684 | End: 2022-04-23
Payer: MEDICARE

## 2022-04-23 PROBLEM — J96.01 ACUTE RESPIRATORY FAILURE WITH HYPOXIA (HCC): Status: ACTIVE | Noted: 2022-04-23

## 2022-04-23 PROBLEM — M19.90 ARTHRITIS: Status: ACTIVE | Noted: 2022-04-23

## 2022-04-23 PROBLEM — N17.9 AKI (ACUTE KIDNEY INJURY) (HCC): Status: ACTIVE | Noted: 2022-04-23

## 2022-04-23 PROBLEM — J44.9 COPD (CHRONIC OBSTRUCTIVE PULMONARY DISEASE) (HCC): Status: ACTIVE | Noted: 2022-01-01

## 2022-04-23 PROBLEM — I48.91 ATRIAL FIBRILLATION (HCC): Status: ACTIVE | Noted: 2022-04-23
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
## 2022-04-23 PROBLEM — I10 HYPERTENSION: Status: ACTIVE | Noted: 2022-01-01

## 2022-04-23 PROBLEM — R09.02 HYPOXIA: Status: ACTIVE | Noted: 2022-04-23

## 2022-04-23 LAB
ALBUMIN SERPL-MCNC: 3.8 G/DL (ref 3.4–5)
ANION GAP SERPL CALCULATED.3IONS-SCNC: 14 MMOL/L (ref 3–16)
APTT: 29.5 SEC (ref 26.2–38.6)
BUN BLDV-MCNC: 38 MG/DL (ref 7–20)
CALCIUM SERPL-MCNC: 9.1 MG/DL (ref 8.3–10.6)
CHLORIDE BLD-SCNC: 98 MMOL/L (ref 99–110)
CO2: 28 MMOL/L (ref 21–32)
CREAT SERPL-MCNC: 1.6 MG/DL (ref 0.6–1.2)
D DIMER: 5194 NG/ML DDU (ref 0–229)
EKG ATRIAL RATE: 63 BPM
EKG DIAGNOSIS: NORMAL
EKG P AXIS: 70 DEGREES
EKG P-R INTERVAL: 180 MS
EKG Q-T INTERVAL: 454 MS
EKG QRS DURATION: 94 MS
EKG QTC CALCULATION (BAZETT): 464 MS
EKG R AXIS: 56 DEGREES
EKG T AXIS: 35 DEGREES
EKG VENTRICULAR RATE: 63 BPM
GFR AFRICAN AMERICAN: 37
GFR NON-AFRICAN AMERICAN: 30
GLUCOSE BLD-MCNC: 100 MG/DL (ref 70–99)
INFLUENZA A: NOT DETECTED
INFLUENZA B: NOT DETECTED
INR BLD: 1.02 (ref 0.88–1.12)
LV EF: 65 %
LVEF MODALITY: NORMAL
PHOSPHORUS: 4.5 MG/DL (ref 2.5–4.9)
POTASSIUM SERPL-SCNC: 4.2 MMOL/L (ref 3.5–5.1)
PROTHROMBIN TIME: 11.5 SEC (ref 9.9–12.7)
SARS-COV-2 RNA, RT PCR: NOT DETECTED
SODIUM BLD-SCNC: 140 MMOL/L (ref 136–145)

## 2022-04-23 PROCEDURE — 85610 PROTHROMBIN TIME: CPT

## 2022-04-23 PROCEDURE — 85379 FIBRIN DEGRADATION QUANT: CPT

## 2022-04-23 PROCEDURE — 2700000000 HC OXYGEN THERAPY PER DAY

## 2022-04-23 PROCEDURE — 36415 COLL VENOUS BLD VENIPUNCTURE: CPT

## 2022-04-23 PROCEDURE — 80069 RENAL FUNCTION PANEL: CPT

## 2022-04-23 PROCEDURE — 85730 THROMBOPLASTIN TIME PARTIAL: CPT

## 2022-04-23 PROCEDURE — 99223 1ST HOSP IP/OBS HIGH 75: CPT | Performed by: INTERNAL MEDICINE

## 2022-04-23 PROCEDURE — 2580000003 HC RX 258: Performed by: PHYSICIAN ASSISTANT

## 2022-04-23 PROCEDURE — 3430000000 HC RX DIAGNOSTIC RADIOPHARMACEUTICAL: Performed by: INTERNAL MEDICINE

## 2022-04-23 PROCEDURE — A9540 TC99M MAA: HCPCS | Performed by: INTERNAL MEDICINE

## 2022-04-23 PROCEDURE — 78582 LUNG VENTILAT&PERFUS IMAGING: CPT

## 2022-04-23 PROCEDURE — 6370000000 HC RX 637 (ALT 250 FOR IP): Performed by: INTERNAL MEDICINE

## 2022-04-23 PROCEDURE — 99222 1ST HOSP IP/OBS MODERATE 55: CPT | Performed by: STUDENT IN AN ORGANIZED HEALTH CARE EDUCATION/TRAINING PROGRAM

## 2022-04-23 PROCEDURE — 6370000000 HC RX 637 (ALT 250 FOR IP): Performed by: EMERGENCY MEDICINE

## 2022-04-23 PROCEDURE — 2060000000 HC ICU INTERMEDIATE R&B

## 2022-04-23 PROCEDURE — 2580000003 HC RX 258: Performed by: INTERNAL MEDICINE

## 2022-04-23 PROCEDURE — 93010 ELECTROCARDIOGRAM REPORT: CPT | Performed by: INTERNAL MEDICINE

## 2022-04-23 PROCEDURE — A9558 XE133 XENON 10MCI: HCPCS | Performed by: INTERNAL MEDICINE

## 2022-04-23 PROCEDURE — 6360000002 HC RX W HCPCS: Performed by: INTERNAL MEDICINE

## 2022-04-23 PROCEDURE — 94761 N-INVAS EAR/PLS OXIMETRY MLT: CPT

## 2022-04-23 PROCEDURE — 93306 TTE W/DOPPLER COMPLETE: CPT

## 2022-04-23 RX ORDER — FUROSEMIDE 10 MG/ML
40 INJECTION INTRAMUSCULAR; INTRAVENOUS ONCE
Status: COMPLETED | OUTPATIENT
Start: 2022-04-23 | End: 2022-04-23

## 2022-04-23 RX ORDER — XENON XE-133 10 MCI/1
12.7 GAS RESPIRATORY (INHALATION)
Status: COMPLETED | OUTPATIENT
Start: 2022-04-23 | End: 2022-04-23

## 2022-04-23 RX ORDER — HEPARIN SODIUM 5000 [USP'U]/ML
5000 INJECTION, SOLUTION INTRAVENOUS; SUBCUTANEOUS 3 TIMES DAILY
Status: DISCONTINUED | OUTPATIENT
Start: 2022-04-23 | End: 2022-04-25 | Stop reason: HOSPADM

## 2022-04-23 RX ORDER — ASPIRIN 81 MG/1
81 TABLET, CHEWABLE ORAL DAILY
Status: DISCONTINUED | OUTPATIENT
Start: 2022-04-23 | End: 2022-04-25 | Stop reason: HOSPADM

## 2022-04-23 RX ORDER — SODIUM CHLORIDE 9 MG/ML
INJECTION, SOLUTION INTRAVENOUS PRN
Status: DISCONTINUED | OUTPATIENT
Start: 2022-04-23 | End: 2022-04-25 | Stop reason: HOSPADM

## 2022-04-23 RX ORDER — HYDROCODONE BITARTRATE AND ACETAMINOPHEN 5; 325 MG/1; MG/1
1 TABLET ORAL ONCE
Status: COMPLETED | OUTPATIENT
Start: 2022-04-23 | End: 2022-04-23

## 2022-04-23 RX ORDER — HEPARIN SODIUM 1000 [USP'U]/ML
4800 INJECTION, SOLUTION INTRAVENOUS; SUBCUTANEOUS PRN
Status: DISCONTINUED | OUTPATIENT
Start: 2022-04-23 | End: 2022-04-23

## 2022-04-23 RX ORDER — IPRATROPIUM BROMIDE AND ALBUTEROL SULFATE 2.5; .5 MG/3ML; MG/3ML
1 SOLUTION RESPIRATORY (INHALATION) ONCE
Status: COMPLETED | OUTPATIENT
Start: 2022-04-23 | End: 2022-04-23

## 2022-04-23 RX ORDER — ONDANSETRON 2 MG/ML
4 INJECTION INTRAMUSCULAR; INTRAVENOUS EVERY 6 HOURS PRN
Status: DISCONTINUED | OUTPATIENT
Start: 2022-04-23 | End: 2022-04-25 | Stop reason: HOSPADM

## 2022-04-23 RX ORDER — SODIUM CHLORIDE 0.9 % (FLUSH) 0.9 %
5-40 SYRINGE (ML) INJECTION EVERY 12 HOURS SCHEDULED
Status: DISCONTINUED | OUTPATIENT
Start: 2022-04-23 | End: 2022-04-25 | Stop reason: HOSPADM

## 2022-04-23 RX ORDER — HEPARIN SODIUM 1000 [USP'U]/ML
4800 INJECTION, SOLUTION INTRAVENOUS; SUBCUTANEOUS ONCE
Status: DISCONTINUED | OUTPATIENT
Start: 2022-04-23 | End: 2022-04-23

## 2022-04-23 RX ORDER — SODIUM CHLORIDE 0.9 % (FLUSH) 0.9 %
5-40 SYRINGE (ML) INJECTION PRN
Status: DISCONTINUED | OUTPATIENT
Start: 2022-04-23 | End: 2022-04-25 | Stop reason: HOSPADM

## 2022-04-23 RX ORDER — 0.9 % SODIUM CHLORIDE 0.9 %
500 INTRAVENOUS SOLUTION INTRAVENOUS ONCE
Status: COMPLETED | OUTPATIENT
Start: 2022-04-23 | End: 2022-04-23

## 2022-04-23 RX ORDER — ACETAMINOPHEN 325 MG/1
650 TABLET ORAL EVERY 6 HOURS PRN
Status: DISCONTINUED | OUTPATIENT
Start: 2022-04-23 | End: 2022-04-25 | Stop reason: HOSPADM

## 2022-04-23 RX ORDER — SODIUM CHLORIDE 9 MG/ML
INJECTION, SOLUTION INTRAVENOUS CONTINUOUS
Status: DISCONTINUED | OUTPATIENT
Start: 2022-04-23 | End: 2022-04-24

## 2022-04-23 RX ORDER — HEPARIN SODIUM 1000 [USP'U]/ML
2400 INJECTION, SOLUTION INTRAVENOUS; SUBCUTANEOUS PRN
Status: DISCONTINUED | OUTPATIENT
Start: 2022-04-23 | End: 2022-04-23

## 2022-04-23 RX ORDER — METOPROLOL TARTRATE 50 MG/1
50 TABLET, FILM COATED ORAL 2 TIMES DAILY
Status: DISCONTINUED | OUTPATIENT
Start: 2022-04-23 | End: 2022-04-23

## 2022-04-23 RX ORDER — ONDANSETRON 4 MG/1
4 TABLET, ORALLY DISINTEGRATING ORAL EVERY 8 HOURS PRN
Status: DISCONTINUED | OUTPATIENT
Start: 2022-04-23 | End: 2022-04-25 | Stop reason: HOSPADM

## 2022-04-23 RX ORDER — HEPARIN SODIUM 10000 [USP'U]/100ML
1080 INJECTION, SOLUTION INTRAVENOUS CONTINUOUS
Status: DISCONTINUED | OUTPATIENT
Start: 2022-04-23 | End: 2022-04-23

## 2022-04-23 RX ORDER — HYDROXYCHLOROQUINE SULFATE 200 MG/1
200 TABLET, FILM COATED ORAL DAILY
Status: DISCONTINUED | OUTPATIENT
Start: 2022-04-23 | End: 2022-04-25 | Stop reason: HOSPADM

## 2022-04-23 RX ORDER — ACETAMINOPHEN 650 MG/1
650 SUPPOSITORY RECTAL EVERY 6 HOURS PRN
Status: DISCONTINUED | OUTPATIENT
Start: 2022-04-23 | End: 2022-04-25 | Stop reason: HOSPADM

## 2022-04-23 RX ADMIN — HEPARIN SODIUM 5000 UNITS: 5000 INJECTION INTRAVENOUS; SUBCUTANEOUS at 09:52

## 2022-04-23 RX ADMIN — HEPARIN SODIUM 5000 UNITS: 5000 INJECTION INTRAVENOUS; SUBCUTANEOUS at 04:30

## 2022-04-23 RX ADMIN — HYDROCODONE BITARTRATE AND ACETAMINOPHEN 1 TABLET: 5; 325 TABLET ORAL at 09:57

## 2022-04-23 RX ADMIN — HEPARIN SODIUM 5000 UNITS: 5000 INJECTION INTRAVENOUS; SUBCUTANEOUS at 22:58

## 2022-04-23 RX ADMIN — FUROSEMIDE 40 MG: 10 INJECTION, SOLUTION INTRAMUSCULAR; INTRAVENOUS at 04:30

## 2022-04-23 RX ADMIN — Medication 5.8 MILLICURIE: at 12:34

## 2022-04-23 RX ADMIN — XENON XE-133 12.7 MILLICURIE: 10 GAS RESPIRATORY (INHALATION) at 12:34

## 2022-04-23 RX ADMIN — IPRATROPIUM BROMIDE AND ALBUTEROL SULFATE 1 AMPULE: .5; 3 SOLUTION RESPIRATORY (INHALATION) at 00:42

## 2022-04-23 RX ADMIN — SODIUM CHLORIDE: 9 INJECTION, SOLUTION INTRAVENOUS at 12:55

## 2022-04-23 RX ADMIN — HYDROXYCHLOROQUINE SULFATE 200 MG: 200 TABLET ORAL at 09:51

## 2022-04-23 RX ADMIN — SODIUM CHLORIDE 500 ML: 9 INJECTION, SOLUTION INTRAVENOUS at 02:12

## 2022-04-23 RX ADMIN — Medication 10 ML: at 09:53

## 2022-04-23 RX ADMIN — ASPIRIN 81 MG 81 MG: 81 TABLET ORAL at 09:51

## 2022-04-23 RX ADMIN — HEPARIN SODIUM 5000 UNITS: 5000 INJECTION INTRAVENOUS; SUBCUTANEOUS at 15:51

## 2022-04-23 ASSESSMENT — PAIN DESCRIPTION - LOCATION
LOCATION: HIP
LOCATION: HIP

## 2022-04-23 ASSESSMENT — PAIN - FUNCTIONAL ASSESSMENT: PAIN_FUNCTIONAL_ASSESSMENT: PREVENTS OR INTERFERES WITH MANY ACTIVE NOT PASSIVE ACTIVITIES

## 2022-04-23 ASSESSMENT — LIFESTYLE VARIABLES
HOW OFTEN DO YOU HAVE A DRINK CONTAINING ALCOHOL: MONTHLY OR LESS
HOW MANY STANDARD DRINKS CONTAINING ALCOHOL DO YOU HAVE ON A TYPICAL DAY: 1 OR 2

## 2022-04-23 ASSESSMENT — PAIN SCALES - GENERAL
PAINLEVEL_OUTOF10: 4
PAINLEVEL_OUTOF10: 2
PAINLEVEL_OUTOF10: 2

## 2022-04-23 ASSESSMENT — PAIN DESCRIPTION - DIRECTION: RADIATING_TOWARDS: NON

## 2022-04-23 ASSESSMENT — PAIN DESCRIPTION - ONSET: ONSET: GRADUAL

## 2022-04-23 ASSESSMENT — PAIN DESCRIPTION - ORIENTATION: ORIENTATION: LEFT

## 2022-04-23 ASSESSMENT — PAIN DESCRIPTION - DESCRIPTORS
DESCRIPTORS: SPASM
DESCRIPTORS: ACHING

## 2022-04-23 ASSESSMENT — PAIN DESCRIPTION - FREQUENCY: FREQUENCY: CONTINUOUS

## 2022-04-23 ASSESSMENT — PAIN DESCRIPTION - PAIN TYPE: TYPE: ACUTE PAIN

## 2022-04-23 NOTE — H&P
History and Physical    Chief Complaint   Patient presents with    Fall     pain in left Hip when walks or move after a fall. HISTORY OF PRESENT ILLNESS:     Patient is a 80-year-old white female who lives in assisted living. She is a somewhat of a poor historian. She was also very agitated with her care last night and initially did not want to talk to anybody. After some time she started answering my questions. She fell at her assisted living place. There is no loss of consciousness. She complained of left hip pain. Patient was found to be hypoxic. She was also found to have a mild JENS. Patient's had prior left hip replacement surgery. X-ray of the left hip in ER showed no fracture. Work-up also showed an elevated D-dimer. She denies any chest pain. She denies any abdominal pain nausea vomiting. There is no head trauma. She is not on oxygen at baseline. Patient is allergic to sulfa antibiotics. Past Medical History:   Diagnosis Date    Arthritis     Atrial fibrillation ([facility] [address]     COPD (chronic obstructive pulmonary disease) ([facility] [address]     Hypertension        History reviewed. No pertinent surgical history. Scheduled Meds:   hydroxychloroquine  200 mg Oral Daily    aspirin  81 mg Oral Daily    sodium chloride flush  5-40 mL IntraVENous 2 times per day    heparin (porcine)  5,000 Units SubCUTAneous TID       Continuous Infusions:   sodium chloride         PRN Meds:  sodium chloride flush, sodium chloride, ondansetron **OR** ondansetron, acetaminophen **OR** acetaminophen, perflutren lipid microspheres       reports that she has never smoked. She does not have any smokeless tobacco history on file. History reviewed. No pertinent family history. Social History     Socioeconomic History    Marital status:       Spouse name: None    Number of children: None    Years of education: None    Highest education level: None   Occupational History    None   Tobacco Use  Smoking status: Never Smoker    Smokeless tobacco: None   Vaping Use    Vaping Use: Never used   Substance and Sexual Activity    Alcohol use: No    Drug use: Never    Sexual activity: None   Other Topics Concern    None   Social History Narrative    None     Social Determinants of Health     Financial Resource Strain:     Difficulty of Paying Living Expenses: Not on file   Food Insecurity:     Worried About Running Out of Food in the Last Year: Not on file    Jamison of Food in the Last Year: Not on file   Transportation Needs:     Lack of Transportation (Medical): Not on file    Lack of Transportation (Non-Medical):  Not on file   Physical Activity:     Days of Exercise per Week: Not on file    Minutes of Exercise per Session: Not on file   Stress:     Feeling of Stress : Not on file   Social Connections:     Frequency of Communication with Friends and Family: Not on file    Frequency of Social Gatherings with Friends and Family: Not on file    Attends Mormonism Services: Not on file    Active Member of [address] [place] [address] or Organizations: Not on file    Attends Club or Organization Meetings: Not on file    Marital Status: Not on file   Intimate Partner Violence:     Fear of Current or Ex-Partner: Not on file    Emotionally Abused: Not on file    Physically Abused: Not on file    Sexually Abused: Not on file   Housing Stability:     Unable to Pay for Housing in the Last Year: Not on file    Number of [place] in the Last Year: Not on file    Unstable Housing in the Last Year: Not on file     REVIEW OF SYSTEMS:   Constitutional: Negative for fever   HENT: Negative for sore throat   Eyes: Negative for redness   Respiratory: + for dyspnea,- cough   Cardiovascular: Negative for chest pain   Gastrointestinal: Negative for vomiting, diarrhea   Genitourinary: Negative for hematuria   Musculoskeletal: Negative for arthralgias   Skin: Negative for rash   Neurological: Negative for syncope +fall  Hematological: Negative for adenopathy   Psychiatric/Behavorial: Negative for anxiety    VS:   BP (!) 159/77   Pulse 68   Temp 95.9 °F (35.5 °C) (Axillary)   Resp 16   Ht 5' 3\" (1.6 m)   Wt 152 lb 6.4 oz (69.1 kg)   SpO2 99%   BMI 27.00 kg/m²     Gen: No distress. Alert. Mild agitated. Eyes: PERRL. No sclera icterus. No conjunctival injection. ENT: No discharge. Pharynx clear. Neck: Trachea midline. Normal thyroid. Resp: Minimal accessory muscle use. + crackles. No wheezes. No rhonchi. No dullness on percussion. CV: Regular rate. Regular rhythm. ESM aortic area. No edema. GI: Non-tender. Non-distended. No masses. No organomegaly. Normal bowel sounds. No hernia. Skin: Warm and dry. No nodule on exposed extremities. No rash on exposed extremities. Lymph: No cervical LAD. No supraclavicular LAD. M/S: No cyanosis. No joint deformity. No clubbing. Tenderness over left hip  Neuro: Awake. Reflexes 2+ symmetric bilaterally. Moves all 4 extremities, non focal  Psych: Oriented x 3. No anxiety or agitation. CBC:   Recent Labs     04/22/22 2213   WBC 8.5   HGB 12.2   HCT 36.2   MCV 88.3        BMP:   Recent Labs     04/22/22 2213 04/23/22  0436    140   K 4.6 4.2   CL 97* 98*   CO2 30 28   PHOS  --  4.5   BUN 40* 38*   CREATININE 1.8* 1.6*     LIVER PROFILE:   Recent Labs     04/22/22 2213   AST 14*   ALT 9*   BILITOT 0.4   ALKPHOS 69     PT/INR:   Recent Labs     04/23/22  0055   PROTIME 11.5   INR 1.02     APTT:   Recent Labs     04/23/22  0055   APTT 29.5     UA:  Recent Labs     04/22/22  2300   COLORU Yellow   PHUR 6.0   CLARITYU Clear   SPECGRAV 1.010   LEUKOCYTESUR Negative   UROBILINOGEN 0.2   BILIRUBINUR Negative   BLOODU Negative   GLUCOSEU Negative     Results for Des Benitez (MRN [id number]) as of 4/23/2022 10:40   Ref.  Range 4/22/2022 23:50   INFLUENZA A Latest Ref Range: NOT DETECTED  NOT DETECTED   INFLUENZA B Latest Ref Range: NOT DETECTED  NOT DETECTED SARS-CoV-2 RNA, RT PCR Latest Ref Range: NOT DETECTED  NOT DETECTED   Results for Thor Hernandez (MRN [id number]) as of 4/23/2022 10:40   Ref. 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 XR CHEST (2 VW)   Final Result   Trace bilateral pleural effusions. NM LUNG VENT/PERFUSION (VQ)    (Results Pending)   NM LUNG VENT/PERFUSION (VQ)    (Results Pending)        ASSESSMENT:    Principal Problem:    Acute respiratory failure with hypoxia (HCC)  Active Problems:    JENS (acute kidney injury) ([address]    Atrial fibrillation (HCC)    COPD (chronic obstructive pulmonary disease) (HCC)    Hypertension    Arthritis    Hypoxia    Fall  Resolved Problems:    * No resolved hospital problems. *      PLAN:    #80year-old white female came to the emergency room after a fall. Work-up showed that she was hypoxic. She was placed on oxygen. Work-up consistent with acute respiratory failure. She is not on baseline oxygen. She had minimal accessory muscle use. She has no crackles on exam.  D-dimer is elevated. Pulmonology was consulted. A VQ scan was ordered to rule out pulmonary embolism. CTPA could not be done due to JENS. #Mild EJNS. On IV fluids. Continue to monitor creatinine. #Atrial fibrillation. In sinus rhythm. On metoprolol. Not on anticoagulation. #Left hip pain. She has had a left hip replacement. She had pain. X-ray negative. Ortho consult obtained. I ordered Norco for pain. Cannot use NSAIDs due to JENS. #Hypertension. Blood pressure slightly elevated. Could be related to pain. #COPD. Stable. #On heparin for DVT prophylaxis. Will need anticoagulation if VQ scan is positive for PE. #Patient is agitated with her care from the night nurse. I was able to calm the patient down.     IMPORTANT: Please note that some portions of this note may have been created using Dragon voice recognition software. Some \"sound-alike\" and totally wrong word substitutions may have taken place due to known inherent limitations of any such software, including this voice recognition software. In spite of efforts to eliminate such errors, some may not have been corrected. So please read the note with this in mind and recognize such mistakes and understand the correct version using the  context. If there are still uncertainties in the mind of the medical provider reading this note about any aspect of the note, the provider can feel free to contact me. Thanks.        Abraham Espinoza MD 4/23/2022 10:38 AM

## 2022-04-23 NOTE — CONSULTS
Orthopedic Surgery Consult                       CC/Reason for consult: Left hip and thigh pain after fall    HPI:      The patient is a 80 y.o. female who presented to the ED after a fall from a skilled nursing facility. She reported left hip and thigh pain with difficulty ambulating. The patient denies any numbness or tingling down the leg. Patient reports most of her pain is to the lateral hip and into the mid thigh. She also reports some shooting pains from the back down past her knee into her lower leg. The patient does have a history of a left hip hemiarthroplasty that was cemented in several years ago in [place], but she is a poor historian and cannot give me the name of the doctor or the rough estimate of timing. The patient reports that she has had multiple falls recently. Past Medical History:    Past Medical History:   Diagnosis Date    Arthritis     Atrial fibrillation ([facility]     COPD (chronic obstructive pulmonary disease) ([facility]     Hypertension        Past Surgical History:    History reviewed. No pertinent surgical history. Medications Prior to Admission:   Prior to Admission medications    Medication Sig Start Date End Date Taking?  Authorizing Provider   furosemide (LASIX) 40 MG tablet Take 40 mg by mouth daily   Yes Historical Provider, MD   metoprolol tartrate (LOPRESSOR) 50 MG tablet Take 50 mg by mouth 2 times daily    Historical Provider, MD   hydroxychloroquine (PLAQUENIL) 200 MG tablet Take by mouth daily    Historical Provider, MD   meloxicam (MOBIC) 15 MG tablet Take 15 mg by mouth daily  Patient not taking: Reported on 4/22/2022    Historical Provider, MD   aspirin 81 MG chewable tablet Take 81 mg by mouth daily    Historical Provider, MD   calcium carbonate (OSCAL) 500 MG TABS tablet Take 500 mg by mouth daily    Historical Provider, MD   potassium chloride (KLOR-CON) 20 MEQ packet Take 20 mEq by mouth 2 times daily    Historical Provider, MD       Allergies:    Sulfa antibiotics    Social History:   Social History     Socioeconomic History    Marital status:      Spouse name: None    Number of children: None    Years of education: None    Highest education level: None   Occupational History    None   Tobacco Use    Smoking status: Never Smoker    Smokeless tobacco: None   Vaping Use    Vaping Use: Never used   Substance and Sexual Activity    Alcohol use: No    Drug use: Never    Sexual activity: None   Other Topics Concern    None   Social History Narrative    None     Social Determinants of Health     Financial Resource Strain:     Difficulty of Paying Living Expenses: Not on file   Food Insecurity:     Worried About Running Out of Food in the Last Year: Not on file    Jamison of Food in the Last Year: Not on file   Transportation Needs:     Lack of Transportation (Medical): Not on file    Lack of Transportation (Non-Medical): Not on file   Physical Activity:     Days of Exercise per Week: Not on file    Minutes of Exercise per Session: Not on file   Stress:     Feeling of Stress : Not on file   Social Connections:     Frequency of Communication with Friends and Family: Not on file    Frequency of Social Gatherings with Friends and Family: Not on file    Attends Congregational Services: Not on file    Active Member of [address] [place] [address] [employer] or Organizations: Not on file    Attends Club or Organization Meetings: Not on file    Marital Status: Not on file   Intimate Partner Violence:     Fear of Current or Ex-Partner: Not on file    Emotionally Abused: Not on file    Physically Abused: Not on file    Sexually Abused: Not on file   Housing Stability:     Unable to Pay for Housing in the Last Year: Not on file    Number of [place] in the Last Year: Not on file    Unstable Housing in the Last Year: Not on file       Family History:  History reviewed. No pertinent family history. REVIEW OF SYSTEMS:   Constitutional: Negative for fever and chills.    HENT: Negative for congestion. Eyes: Negative for blurred vision and double vision. Respiratory: Negative for cough, shortness of breath and wheezing. Cardiovascular: Negative for chest pain and palpitations. Gastrointestinal: Negative for nausea. Negative for vomiting. Musculoskeletal: Positive for myalgias and joint pain left hip and thigh. Skin: Negative for itching and rash. Neurological: Negative for dizziness, sensory change and headaches. Psychiatric/Behavioral: Negative for depression and suicidal ideas. PHYSICAL EXAM:  Blood pressure (!) 159/77, pulse 68, temperature 95.9 °F (35.5 °C), temperature source Axillary, resp. rate 16, height 5' 3\" (1.6 m), weight 152 lb 6.4 oz (69.1 kg), SpO2 99 %. Gen: alert and oriented, NAD, cooperative    LLE: No ecchymoses, abrasions, deformity, or lacerations. Skin intact. Surgical scar noted to the lateral aspect of the hip, well-healed. Tender to palpation about the mid thigh laterally. No groin pain. Compartments soft. EHL/FHL/TA/GS complex motor intact. Sural, saphenous, superificial/deep peroneal, and plantar nerve distribution sensation grossly intact. Dorsalis pedis/posterior tibial pulses 2+ with BCR. -log roll. LABS:  Recent Labs     04/22/22  2213 04/22/22  2213 04/23/22  0055 04/23/22  0436   WBC 8.5  --   --   --    HGB 12.2  --   --   --    HCT 36.2  --   --   --      --   --   --    INR  --   --  1.02  --       < >  --  140   K 4.6  --   --  4.2   BUN 40*   < >  --  38*   CREATININE 1.8*   < >  --  1.6*   GLUCOSE 102*   < >  --  100*    < > = values in this interval not displayed. Radiology:   X-ray of the left hip demonstrates a stable appearing cemented hip hemiarthroplasty. No acute fractures are noted. No subsidence noted. AP pelvis demonstrates moderate to severe arthritis in the lumbar spine.     A/P: 80 y.o. female being seen for left hip pain after several falls, likely exacerbation of lumbar radiculopathy and muscular injury to left    -No acute orthopedic surgical intervention indicated at this time. The patient has no groin pain. X-rays demonstrate a stable implant. Low concern for periprosthetic fracture or loosening. She may have some transferring of pain to the mid thigh at the end of the stem. However most of her pain appears to be coming from her back. Recommend medical and spine evaluation  -Weight bearing as tolerated left leg  -Normal diet  -Pain control per primary  -Ice and elevation for pain/swelling  -DVT ppx: Per primary  -She can follow-up as needed outpatient upon discharge.   Ortho will sign off  -Please call with any questions    Argenis Desai, DO  Orthopedic Surgery and Sports Medicine  10:06 AM 4/23/2022

## 2022-04-23 NOTE — PROGRESS NOTES
Shift report given to nurse Rochelle Werner at bedside. Patient care handed off in stable condition at this time.  Eloy Johnson RN

## 2022-04-23 NOTE — PLAN OF CARE
Fall, mechanical  Noted to be hypoxic, trace pleural effusions and elevated BNP. ? CHF. Echo ordered.   JENS, elevated Cr from prior (care everywhere)  Elevated dimer, r/o PE  - Lasix IV x 1, reevaluate in the AM  - V/Q scan in AM  - Admit to telemetry

## 2022-04-23 NOTE — PROGRESS NOTES
Shift assessment complete, morning medications given, patient resting with no complaints of pain, will continue to monitor.  Biju Herrera RN

## 2022-04-23 NOTE — PROGRESS NOTES
Report given to Mayuri Powell RN. Pt extremely agitated at shift change. MD and Mayuri Powell RN at bedside. Care transferred at this time.

## 2022-04-23 NOTE — ED PROVIDER NOTES
Emergency Department Provider Note     Location: [place] [address] ED  4/22/2022    I independently performed a history and physical on Harshad Diaz. All diagnostic, treatment, and disposition decisions were made by myself in conjunction with the mid-level provider. I performed a substantive portion of the MDM for this patient    Briefly, this is a 80 y.o. female here for fall, left hip pain. Patient is a poor historian of timeline, but she does admit to recent JOYA, but no chest pain, no current SOB at rest.    ED Triage Vitals [04/22/22 1941]   BP Temp Temp Source Pulse Resp SpO2 Height Weight   (!) 154/65 97.8 °F (36.6 °C) Oral 66 16 (!) 88 % 5' 3\" (1.6 m) 156 lb (70.8 kg)      Exam:  no acute distress, A&Ox4, heart RRR, systolic murmur patient was aware of, lungs diminished throughout, trace end wheeze, no significant crackles or rhonchi, resp. Nonlabored but slightly tachypneic around 20, no abd tenderness, no peritoneal signs/no rebound/no guarding, no pitting edema, chronic appearing LE edema, soft calves, but slight tenderness of L calf and thigh, as well as L lateral hip, no obvious deformity, no pelvic instability    Patient seen and examined. EKG  The Ekg interpreted by me in the absence of a cardiologist shows. Normal sinus rhythm, rate 63, QTc 464, normal axis, no ST segment or T wave changes indicative of acute ischemia    XR CHEST (2 VW)    Result Date: 4/22/2022  EXAMINATION: TWO XRAY VIEWS OF THE CHEST 4/22/2022 9:32 pm COMPARISON: 11/13/2016 HISTORY: ORDERING SYSTEM PROVIDED HISTORY: shortness of breath TECHNOLOGIST PROVIDED HISTORY: Reason for exam:->shortness of breath Reason for Exam: SOB FINDINGS: The lungs are without acute focal process. Trace bilateral pleural effusions. The cardiomediastinal silhouette is without acute process. The osseous structures are without acute process. Trace bilateral pleural effusions.      XR HIP LEFT (2-3 VIEWS)    Result Date: 4/22/2022  EXAMINATION: TWO XRAY VIEWS OF THE LEFT HIP 4/22/2022 9:32 pm COMPARISON: None. HISTORY: ORDERING SYSTEM PROVIDED HISTORY: Injury TECHNOLOGIST PROVIDED HISTORY: Reason for exam:->Injury Reason for Exam: Pt fell FINDINGS: There is a left total hip arthroplasty with noncemented components. No acute fracture or dislocation. No acute hardware failure or loosening. Grossly normal alignment. Total hip arthropasty without acute hardware complication.        Results for orders placed or performed during the hospital encounter of 04/22/22   COVID-19 & Influenza Combo    Specimen: Nasopharyngeal Swab   Result Value Ref Range    SARS-CoV-2 RNA, RT PCR NOT DETECTED NOT DETECTED    INFLUENZA A NOT DETECTED NOT DETECTED    INFLUENZA B NOT DETECTED NOT DETECTED   CBC with Auto Differential   Result Value Ref Range    WBC 8.5 4.0 - 11.0 K/uL    RBC 4.09 4.00 - 5.20 M/uL    Hemoglobin 12.2 12.0 - 16.0 g/dL    Hematocrit 36.2 36.0 - 48.0 %    MCV 88.3 80.0 - 100.0 fL    MCH 29.7 26.0 - 34.0 pg    MCHC 33.6 31.0 - 36.0 g/dL    RDW 14.6 12.4 - 15.4 %    Platelets 399 190 - 645 K/uL    MPV 6.7 5.0 - 10.5 fL    Neutrophils % 75.5 %    Lymphocytes % 10.6 %    Monocytes % 11.7 %    Eosinophils % 1.9 %    Basophils % 0.3 %    Neutrophils Absolute 6.4 1.7 - 7.7 K/uL    Lymphocytes Absolute 0.9 (L) 1.0 - 5.1 K/uL    Monocytes Absolute 1.0 0.0 - 1.3 K/uL    Eosinophils Absolute 0.2 0.0 - 0.6 K/uL    Basophils Absolute 0.0 0.0 - 0.2 K/uL   Comprehensive Metabolic Panel w/ Reflex to MG   Result Value Ref Range    Sodium 138 136 - 145 mmol/L    Potassium reflex Magnesium 4.6 3.5 - 5.1 mmol/L    Chloride 97 (L) 99 - 110 mmol/L    CO2 30 21 - 32 mmol/L    Anion Gap 11 3 - 16    Glucose 102 (H) 70 - 99 mg/dL    BUN 40 (H) 7 - 20 mg/dL    CREATININE 1.8 (H) 0.6 - 1.2 mg/dL    GFR Non-African American 26 (A) >60    GFR  32 (A) >60    Calcium 9.2 8.3 - 10.6 mg/dL    Total Protein 6.4 6.4 - 8.2 g/dL    Albumin 3.9 3.4 - 5.0 g/dL    Albumin/Globulin Ratio 1.6 1.1 - 2.2    Total Bilirubin 0.4 0.0 - 1.0 mg/dL    Alkaline Phosphatase 69 40 - 129 U/L    ALT 9 (L) 10 - 40 U/L    AST 14 (L) 15 - 37 U/L   Troponin   Result Value Ref Range    Troponin <0.01 <0.01 ng/mL   Blood Gas, Venous   Result Value Ref Range    pH, Manuel 7.420 7.350 - 7.450    pCO2, Manuel 43.6 40.0 - 50.0 mmHg    pO2, Manuel 134.6 (H) 25.0 - 40.0 mmHg    HCO3, Venous 27.6 23.0 - 29.0 mmol/L    Base Excess, Manuel 2.8 -3.0 - 3.0 mmol/L    O2 Sat, Manuel 99 Not Established %    Carboxyhemoglobin 1.8 (H) 0.0 - 1.5 %    MetHgb, Manuel 0.3 <1.5 %    TC02 (Calc), Manuel 29 Not Established mmol/L    O2 Therapy Unknown    Urinalysis with Reflex to Culture    Specimen: Urine   Result Value Ref Range    Color, UA Yellow Straw/Yellow    Clarity, UA Clear Clear    Glucose, Ur Negative Negative mg/dL    Bilirubin Urine Negative Negative    Ketones, Urine Negative Negative mg/dL    Specific Gravity, UA 1.010 1.005 - 1.030    Blood, Urine Negative Negative    pH, UA 6.0 5.0 - 8.0    Protein, UA Negative Negative mg/dL    Urobilinogen, Urine 0.2 <2.0 E.U./dL    Nitrite, Urine Negative Negative    Leukocyte Esterase, Urine Negative Negative    Microscopic Examination Not Indicated     Urine Type NotGiven     Urine Reflex to Culture Not Indicated    Brain Natriuretic Peptide   Result Value Ref Range    Pro-BNP 9,235 (H) 0 - 449 pg/mL   D-Dimer, Quantitative   Result Value Ref Range    D-Dimer, Quant 5194 (H) 0 - 229 ng/mL DDU   EKG 12 Lead   Result Value Ref Range    Ventricular Rate 63 BPM    Atrial Rate 63 BPM    P-R Interval 180 ms    QRS Duration 94 ms    Q-T Interval 454 ms    QTc Calculation (Bazett) 464 ms    P Axis 70 degrees    R Axis 56 degrees    T Axis 35 degrees    Diagnosis       Normal sinus rhythmPossible Left atrial enlargementBorderline ECGNo previous ECGs available         For further details of Harshad Diaz's emergency department encounter, please see SANKET Parks's documentation. 60-year-old female with fall, no acute findings on imaging related to the injuries, no fracture/dislocation, she does have difficulty with ambulation due to pain, she was incidentally noted to be hypoxic, is not normally on oxygen, given reported history of COPD, though she was never smoker, had tried a DuoNeb which did not really help much, and she did not appear to be in a COPD exacerbation, she has been seen multiple times for lower extremity edema in the past, so this appears to be chronic, and she does not appear to be fluid overloaded, she did have an elevated BNP, had some suspicion for possible PE, but she does have JENS, given fluids, decided to check a D-dimer to risk stratify and planned to age-adjust, but this ended up being greater than 5000, so we opted to treat with heparin until PE can be further evaluated, did not suspect infectious process/sepsis, patient was agreeable to admission, PA spoke to hospitalist who agreed to accept.     Orders Placed This Encounter   Procedures    COVID-19 & Influenza Combo    XR CHEST (2 VW)    XR HIP LEFT (2-3 VIEWS)    NM LUNG VENT/PERFUSION (VQ)    CBC with Auto Differential    Comprehensive Metabolic Panel w/ Reflex to MG    Troponin    Blood Gas, Venous    Urinalysis with Reflex to Culture    Brain Natriuretic Peptide    D-Dimer, Quantitative    Protime-INR    APTT    Anti-Xa, Unfractionated Heparin    Inpatient consult to Hospitalist    EKG 12 Lead     Orders Placed This Encounter   Medications    ipratropium-albuterol (DUONEB) nebulizer solution 1 ampule     Order Specific Question:   Initiate RT Bronchodilator Protocol     Answer:   No    heparin (porcine) injection 4,800 Units    heparin (porcine) injection 4,800 Units    heparin (porcine) injection 2,400 Units    heparin 25,000 units in dextrose 5% 250 mL (premix) infusion    0.9 % sodium chloride bolus         The total critical care time spent while evaluating and treating this patient was 32 minutes. This excludes time spent doing separately billable procedures. This includes time at the bedside, data interpretation, medication management, obtaining critical history from collateral sources if the patient is unable to provide it directly, and physician consultation. Specifics of interventions taken and potentially life-threatening diagnostic considerations are listed in the medical decision making. Clinical Impression:  1. Acute respiratory failure with hypoxia ([address]    2. Contusion of left hip, initial encounter    3. JENS (acute kidney injury) ([address]    4. Pleural effusion    5. Elevated d-dimer      Disposition:  Patient admitted to the hospital in stable condition. This chart was generated in part by using Dragon Dictation system and may contain errors related to that system including errors in grammar, punctuation, and spelling, as well as words and phrases that may be inappropriate. If there are any questions or concerns please feel free to contact the dictating provider for clarification.                Yenny Pulliam DO  04/24/22 9040

## 2022-04-23 NOTE — CONSULTS
PULMONARY CONSULT NOTE  Harshad CLINTON Gadiel Hassan is being seen at the request of Dr. Liana Fang for a consultation for Hypoxemia, dyspnea & elevated D-dimer  Hospital Day: 2     HISTORY OF PRESENT ILLNESS: Kirstin Jaffe is a 80 y.o. female with a PMHx of COPD, paroxysmal AFIB and LE edema on lasix, who previously received all her care at [facility] facilities but recently moved to [place] for [place] higher leveled skilled nursing facility this year who presented to the ED on 4/22/2022 with moderate left hip pain after a fall in her room at her SNF after she lost her footing, worse with bearing weight; no associated numbness or head trauma. Additionally, she's had 1 week of worsening sob prompting a CXR the prior day that was normal.  Chronic RAFAELA at baseline. No baseline O2. Pt was requiring 2 L O2 in the ER and was unable to be weaned and so was admitted. ER was unable to obtain CTPA d/t elevated Cr so started therapeutic dose lovenox. Now states breathing feels fine. Reports she has never been on [facility] [place] for pAFIB. PAST MEDICAL HISTORY:  Past Medical History:   Diagnosis Date    Arthritis     [facility]     COPD (chronic obstructive pulmonary disease) ([facility]     Hypertension      PAST SURGICAL HISTORY:  History reviewed. No pertinent surgical history. FAMILY HISTORY:  family history is not on file. SOCIAL HISTORY:   reports that she has never smoked. She does not have any smokeless tobacco history on file.     Scheduled Meds:   hydroxychloroquine  200 mg Oral Daily    aspirin  81 mg Oral Daily    sodium chloride flush  5-40 mL IntraVENous 2 times per day    heparin (porcine)  5,000 Units SubCUTAneous TID    HYDROcodone 5 mg - acetaminophen  1 tablet Oral Once     Continuous Infusions:   sodium chloride       PRN Meds:  sodium chloride flush, sodium chloride, ondansetron **OR** ondansetron, acetaminophen **OR** acetaminophen, perflutren lipid microspheres    ALLERGIES:  Patient is allergic to sulfa antibiotics. REVIEW OF SYSTEMS:  Constitutional: Negative for fever  HENT: Negative for sore throat  Eyes: Negative for redness   Respiratory: + for dyspnea, cough  Cardiovascular: Negative for chest pain  Gastrointestinal: Negative for vomiting, diarrhea   Genitourinary: Negative for hematuria   Musculoskeletal: +for hip pain  Skin: Negative for rash  Neurological: Negative for syncope  Hematological: Negative for adenopathy  Psychiatric/Behavorial: Negative for anxiety    PHYSICAL EXAM:  Blood pressure (!) 157/83, pulse 76, temperature 98.1 °F (36.7 °C), temperature source Oral, resp. rate 17, height 5' 3\" (1.6 m), weight 152 lb 6.4 oz (69.1 kg), SpO2 92 %.' on 2 L  Gen:  No distress. Eyes:  PERRL. No sclera icterus. No conjunctival injection. ENT:  No discharge. Pharynx clear. Neck:  Trachea midline. No obvious mass. Resp:  No accessory muscle use. No crackles. No wheezes. No rhonchi. No dullness on percussion. CV:  Regular rate. Regular rhythm. No murmur or rub. No edema. Peripheral pulses are 2+. Capillary refill is less than 3 seconds. GI:  Non-tender. Non-distended. No hernia. Skin:  Warm and dry. No nodule on exposed extremities. Lymph:  No cervical LAD. No supraclavicular LAD. M/S:  No cyanosis. No joint deformity. No clubbing. Neuro:  Awake. Alert. Moves all four extremities. Psych:  Oriented x 3. No anxiety.      I independently performed the physical exam  Jaimie Walker MD on 4/23/22, 11:23 AM EDT     LABS:  CBC:   Recent Labs     04/22/22 2213   WBC 8.5   HGB 12.2   HCT 36.2   MCV 88.3        BMP:   Recent Labs     04/22/22 2213 04/23/22  0436    140   K 4.6 4.2   CL 97* 98*   CO2 30 28   PHOS  --  4.5   BUN 40* 38*   CREATININE 1.8* 1.6*     LIVER PROFILE:   Recent Labs     04/22/22 2213   AST 14*   ALT 9*   BILITOT 0.4   ALKPHOS 69     PT/INR:   Recent Labs     04/23/22  0055   PROTIME 11.5   INR 1.02     APTT:   Recent Labs     04/23/22  0055   APTT 29.5 UA:  Recent Labs     04/22/22  2300   COLORU Yellow   PHUR 6.0   CLARITYU Clear   SPECGRAV 1.010   LEUKOCYTESUR Negative   UROBILINOGEN 0.2   BILIRUBINUR Negative   BLOODU Negative   GLUCOSEU Negative     No results for input(s): PHART, RXC9MMN, PO2ART in the last 72 hours. Micro:   4/22/2022 SARS-CoV-2 & influenza not detected  BNP 9,235  (3,363 in Feb 2021)     Imaging: Chest imaging was reviewed by me and showed:  [facility] 4/12/2021 [facility]  No significant mediastinal or hilar lymphadenopathy. There is a small irregular parenchymal density seen superior segment left lower lobe measuring approximately 6 x 13 mm. This could represent some focal consolidation or possibility of a mass lesion difficult to exclude. Clinical correlation and short interval follow-up CT is recommended in 3-6 months time versus possible PET scan   Patchy bibasilar airspace disease also noted. Centrilobular emphysema present. Patchy airspace disease also noted posteriorly right upper lobe. 1. No evidence of pulmonary emboli. .   2. Question focal consolidation versus a pulmonary mass superior segment left lower lobe. Short interval follow-up CT recommended versus possible PET scan. 3. Trace pleural effusions. 4. Bilateral areas of airspace disease as described above. 5. Questionable retrocrural lymphadenopathy versus prominent venous structure. 6. Marked compression deformity of T11 with milder compression deformity T12       CXR 4/22/2022  Trace bilateral pleural effusions. ASSESSMENT:  · Acute hypoxemic respiratory failure, baseline RA  · Elevated d-dimer 5,194  (438 in Apr 2021)   · JENS  · Fall with left hip pain  · PAFIB, not on AC  · COPD/pulmonary emphysema - without exacerbation  · Pulmonary consolidation - 0.6 x 1.3 cm superior MARY - on outside imaging. I d/w patient. She is clear -- she does not wish to pursue any additional studies or imaging.   She knows this could be cancer, but wishes to do \"nothing I don't absolutely have to. \"  · Elevated SPAP 60 mmHg Echo 4/2017  · Dysphagia/esophageal dysmotility: with h/o abnormal manometry with EGJ outflow obstruction & features of type II achalasia. S/p EGD with botox 2/2021  · Never smoker per Epic    PLAN:  · Supplemental O2 to maintain SaO2 >92%; wean as tolerated    · VQ pending  · Inhaled bronchodilators (Anoro at home per PCP note)   · Ortho has been consulted. · If VQ is high probability, will require anticoagulation. I d/w Dr. Juan F Ramirez      I contributed to updating portions of this encounter note.    Kirt Palomo, THIEN on 4/23/22 at 9:17 AM EDT

## 2022-04-23 NOTE — FLOWSHEET NOTE
Admission assessment complete. See flowsheet for full assessment. Pt denies any needs, call light within reach.      04/23/22 0357   Vital Signs   Temp 98.1 °F (36.7 °C)   Temp Source Oral   Pulse 76   Heart Rate Source Monitor   Resp 17   BP (!) 157/83   BP Location Left upper arm   MAP (Calculated) 107.67   Level of Consciousness Alert (0)   MEWS Score 1   Pain Assessment   Pain Assessment 0-10   Pain Level   (Pt states \"I dont know\")   Pain Location Hip   Pain Descriptors Spasm   Oxygen Therapy   SpO2 92 %   O2 Device Nasal cannula   O2 Flow Rate (L/min) 2 L/min   Height and Weight   Height 5' 3\" (1.6 m)   Weight 152 lb 6.4 oz (69.1 kg)   Weight Method Bed scale   BSA (Calculated - sq m) 1.75 sq meters   BMI (Calculated) 27.1

## 2022-04-23 NOTE — FLOWSHEET NOTE
04/23/22 1430   Vital Signs   Temp 96.1 °F (35.6 °C)   Temp Source Oral   Pulse 61   Heart Rate Source Monitor   Resp 16   BP (!) 102/59   BP Location Right upper arm   MAP (Calculated) 73.33   Patient Position High fowlers   Level of Consciousness Alert (0)   MEWS Score 1   Pain Assessment   Pain Assessment 0-10   Pain Level 2   Oxygen Therapy   SpO2 98 %   O2 Device Nasal cannula   O2 Flow Rate (L/min) 2 L/min       Patient resting at time of vitals. Patient continues to have pain in left hip area but is able to reposition self to help minimize discomfort.  Zachary Santos, YULIYA

## 2022-04-23 NOTE — PROGRESS NOTES
Patient admitted to room 313 from ED. Patient oriented to room, call light, bed rails, phone, lights and bathroom. Patient instructed about the schedule of the day including: vital sign frequency, lab draws, possible tests, frequency of MD and staff rounds, daily weights, I &O's and prescribed diet. Bed alarm in place, patient aware of placement and reason. Telemetry box in place, patient aware of placement and reason. Bed locked, in lowest position, side rails up 2/4, call light within reach. Recliner Assessment  Patient is able to demonstrate the ability to move from a reclining position to an upright position within the recliner. 4 Eyes Skin Assessment     The patient is being assess for   Admission    I agree that 2 RN's have performed a thorough Head to Toe Skin Assessment on the patient. ALL assessment sites listed below have been assessed. Areas assessed for pressure by both nurses:   [x]   Head, Face, and Ears   [x]   Shoulders, Back, and Chest, Abdomen  [x]   Arms, Elbows, and Hands   [x]   Coccyx, Sacrum, and Ischium  [x]   Legs, Feet, and Heels                   **SHARE this note so that the co-signing nurse is able to place an eSignature**    Co-signer eSignature: Electronically signed by Cynthia Buenrostro RN on 4/23/22 at 5:56 AM EDT    Does the Patient have Skin Breakdown related to pressure?   No              Raghu Prevention initiated:  No   Wound Care Orders initiated:  No      [facility] nurse consulted for Pressure Injury (Stage 3,4, Unstageable, DTI, NWPT, Complex wounds)and New or Established Ostomies:  No      Primary Nurse eSignature: Electronically signed by Lizett Quiroga RN on 4/23/22 at 5:02 AM EDT

## 2022-04-23 NOTE — CONSULTS
Pharmacy to Manage Heparin Infusion per [facility]    Dx: Possible DVT/PE  Pt wt = 59.8 kg - adjusted. Baseline anti-Xa and/or aPTT = Pending    Oral factor Xa-inhibitors may alter and elevate anti-Xa levels used for unfractionated heparin monitoring. As a result, anti-Xa monitoring is not accurate while Xa-inhibitor activity is detectable. Utilize aPTT monitoring when patient received an oral factor Xa-inhibitor (apixaban, betrixaban, edoxaban or rivaroxaban) within 72 hours prior to admission (please document last administration time). The goal is to allow a washout of oral factor Xa-inhibitors by using aPTT for 72 hours, then change to ant-Xa levels for UFH. High Dose Heparin Infusion  Heparin 80 units/kg IVP bolus followed by Heparin infusion at 18 units/kg/hr (recommended initial max dose 2100 units/hr). Recheck aPTT in 6 hours. Goal anti-Xa 0.3-0.7 IU/mL  Goal aPTT = 60-90 seconds.   Bartolo Bermudez, PharmD  4/23/2022 2:06 AM

## 2022-04-24 ENCOUNTER — APPOINTMENT (OUTPATIENT)
Dept: GENERAL RADIOLOGY | Age: 87
DRG: 684 | End: 2022-04-24
Payer: MEDICARE

## 2022-04-24 LAB
ALBUMIN SERPL-MCNC: 3.9 G/DL (ref 3.4–5)
ANION GAP SERPL CALCULATED.3IONS-SCNC: 8 MMOL/L (ref 3–16)
ANION GAP SERPL CALCULATED.3IONS-SCNC: 9 MMOL/L (ref 3–16)
BASOPHILS ABSOLUTE: 0 K/UL (ref 0–0.2)
BASOPHILS RELATIVE PERCENT: 0.6 %
BUN BLDV-MCNC: 27 MG/DL (ref 7–20)
BUN BLDV-MCNC: 27 MG/DL (ref 7–20)
CALCIUM SERPL-MCNC: 8.7 MG/DL (ref 8.3–10.6)
CALCIUM SERPL-MCNC: 8.8 MG/DL (ref 8.3–10.6)
CHLORIDE BLD-SCNC: 101 MMOL/L (ref 99–110)
CHLORIDE BLD-SCNC: 99 MMOL/L (ref 99–110)
CO2: 29 MMOL/L (ref 21–32)
CO2: 31 MMOL/L (ref 21–32)
CREAT SERPL-MCNC: 1 MG/DL (ref 0.6–1.2)
CREAT SERPL-MCNC: 1 MG/DL (ref 0.6–1.2)
EOSINOPHILS ABSOLUTE: 0.3 K/UL (ref 0–0.6)
EOSINOPHILS RELATIVE PERCENT: 5.5 %
GFR AFRICAN AMERICAN: >60
GFR AFRICAN AMERICAN: >60
GFR NON-AFRICAN AMERICAN: 52
GFR NON-AFRICAN AMERICAN: 52
GLUCOSE BLD-MCNC: 85 MG/DL (ref 70–99)
GLUCOSE BLD-MCNC: 86 MG/DL (ref 70–99)
HCT VFR BLD CALC: 37.4 % (ref 36–48)
HEMOGLOBIN: 12.6 G/DL (ref 12–16)
LYMPHOCYTES ABSOLUTE: 1.2 K/UL (ref 1–5.1)
LYMPHOCYTES RELATIVE PERCENT: 20.6 %
MCH RBC QN AUTO: 29.9 PG (ref 26–34)
MCHC RBC AUTO-ENTMCNC: 33.7 G/DL (ref 31–36)
MCV RBC AUTO: 88.7 FL (ref 80–100)
MONOCYTES ABSOLUTE: 0.7 K/UL (ref 0–1.3)
MONOCYTES RELATIVE PERCENT: 12.1 %
NEUTROPHILS ABSOLUTE: 3.7 K/UL (ref 1.7–7.7)
NEUTROPHILS RELATIVE PERCENT: 61.2 %
PDW BLD-RTO: 14.8 % (ref 12.4–15.4)
PHOSPHORUS: 3.3 MG/DL (ref 2.5–4.9)
PLATELET # BLD: 154 K/UL (ref 135–450)
PMV BLD AUTO: 7.2 FL (ref 5–10.5)
POTASSIUM REFLEX MAGNESIUM: 4.1 MMOL/L (ref 3.5–5.1)
POTASSIUM SERPL-SCNC: 4.1 MMOL/L (ref 3.5–5.1)
RBC # BLD: 4.22 M/UL (ref 4–5.2)
SODIUM BLD-SCNC: 138 MMOL/L (ref 136–145)
SODIUM BLD-SCNC: 139 MMOL/L (ref 136–145)
WBC # BLD: 6 K/UL (ref 4–11)

## 2022-04-24 PROCEDURE — 85025 COMPLETE CBC W/AUTO DIFF WBC: CPT

## 2022-04-24 PROCEDURE — 99233 SBSQ HOSP IP/OBS HIGH 50: CPT | Performed by: INTERNAL MEDICINE

## 2022-04-24 PROCEDURE — 2700000000 HC OXYGEN THERAPY PER DAY

## 2022-04-24 PROCEDURE — 2060000000 HC ICU INTERMEDIATE R&B

## 2022-04-24 PROCEDURE — 71045 X-RAY EXAM CHEST 1 VIEW: CPT

## 2022-04-24 PROCEDURE — 94761 N-INVAS EAR/PLS OXIMETRY MLT: CPT

## 2022-04-24 PROCEDURE — 6360000002 HC RX W HCPCS: Performed by: INTERNAL MEDICINE

## 2022-04-24 PROCEDURE — 6370000000 HC RX 637 (ALT 250 FOR IP): Performed by: INTERNAL MEDICINE

## 2022-04-24 PROCEDURE — 99232 SBSQ HOSP IP/OBS MODERATE 35: CPT | Performed by: INTERNAL MEDICINE

## 2022-04-24 PROCEDURE — 80069 RENAL FUNCTION PANEL: CPT

## 2022-04-24 PROCEDURE — 2580000003 HC RX 258: Performed by: INTERNAL MEDICINE

## 2022-04-24 PROCEDURE — 36415 COLL VENOUS BLD VENIPUNCTURE: CPT

## 2022-04-24 RX ADMIN — HEPARIN SODIUM 5000 UNITS: 5000 INJECTION INTRAVENOUS; SUBCUTANEOUS at 15:11

## 2022-04-24 RX ADMIN — HEPARIN SODIUM 5000 UNITS: 5000 INJECTION INTRAVENOUS; SUBCUTANEOUS at 09:20

## 2022-04-24 RX ADMIN — SALINE NASAL SPRAY 1 SPRAY: 1.5 SOLUTION NASAL at 21:07

## 2022-04-24 RX ADMIN — HYDROXYCHLOROQUINE SULFATE 200 MG: 200 TABLET ORAL at 09:20

## 2022-04-24 RX ADMIN — Medication 10 ML: at 20:13

## 2022-04-24 RX ADMIN — ASPIRIN 81 MG 81 MG: 81 TABLET ORAL at 09:20

## 2022-04-24 RX ADMIN — HEPARIN SODIUM 5000 UNITS: 5000 INJECTION INTRAVENOUS; SUBCUTANEOUS at 20:12

## 2022-04-24 RX ADMIN — ACETAMINOPHEN 650 MG: 325 TABLET ORAL at 21:23

## 2022-04-24 RX ADMIN — Medication 10 ML: at 09:23

## 2022-04-24 ASSESSMENT — PAIN DESCRIPTION - ONSET
ONSET: GRADUAL
ONSET: GRADUAL

## 2022-04-24 ASSESSMENT — PAIN DESCRIPTION - ORIENTATION
ORIENTATION: LEFT
ORIENTATION: LEFT

## 2022-04-24 ASSESSMENT — PAIN - FUNCTIONAL ASSESSMENT
PAIN_FUNCTIONAL_ASSESSMENT: PREVENTS OR INTERFERES WITH MANY ACTIVE NOT PASSIVE ACTIVITIES
PAIN_FUNCTIONAL_ASSESSMENT: PREVENTS OR INTERFERES WITH MANY ACTIVE NOT PASSIVE ACTIVITIES

## 2022-04-24 ASSESSMENT — PAIN DESCRIPTION - DIRECTION: RADIATING_TOWARDS: NON

## 2022-04-24 ASSESSMENT — PAIN SCALES - GENERAL
PAINLEVEL_OUTOF10: 0
PAINLEVEL_OUTOF10: 2
PAINLEVEL_OUTOF10: 3
PAINLEVEL_OUTOF10: 2
PAINLEVEL_OUTOF10: 1
PAINLEVEL_OUTOF10: 2
PAINLEVEL_OUTOF10: 2

## 2022-04-24 ASSESSMENT — PAIN DESCRIPTION - LOCATION
LOCATION: HIP
LOCATION: LEG
LOCATION: HIP

## 2022-04-24 ASSESSMENT — PAIN DESCRIPTION - PAIN TYPE
TYPE: ACUTE PAIN
TYPE: ACUTE PAIN

## 2022-04-24 ASSESSMENT — PAIN DESCRIPTION - DESCRIPTORS
DESCRIPTORS: ACHING;DISCOMFORT
DESCRIPTORS: ACHING

## 2022-04-24 ASSESSMENT — PAIN DESCRIPTION - FREQUENCY
FREQUENCY: CONTINUOUS
FREQUENCY: CONTINUOUS

## 2022-04-24 NOTE — PROGRESS NOTES
Attempted to take patients vitals. Patient screaming in pain that writer is hurting her arm. Writer suggested moving blood pressure cuff to leg. Patient refused. Anotehr RN attempted to take BP. Patient continued to refuse. All other vitals stable. Call light within reach. Will continue to monitor.

## 2022-04-24 NOTE — PROGRESS NOTES
Shift assessment complete, morning medications given, patient resting with  complaints of pain 2 out of 10 no medication requested, will continue to monitor.  Clemente Flores RN

## 2022-04-24 NOTE — PROGRESS NOTES
Patient became extremely aggressive and upset. Patient states she can not breath and needed to get up. Writer verified Patients Oxygen level was 88 percent and increased O2 to 5L. Writer sat with Patient for 40 minutes. Patient requested to sit in recliner. PCA and writer attempted to get Patient up from sitting bedside to chair however Patient refused to let us touch her tell us we were grabbing her hard. She was unable to stand independently and refused to lay down in bed. Again Patient was verbally aggressive. Pure wicck was unhooked while Patient was sitting on the side of the bed. After 40 minutes writer and PCA put patient back to bed. Bed was wet where Patient was sitting. Bed, attends, and pure wick changed. Patient was upset and insisted we left her in Urine all night. Patient did not understand that the Pure wick had been unhooked while she sat which is why the bed was wet. Patient given fresh ice water.

## 2022-04-24 NOTE — PROGRESS NOTES
Bedside report and transfer of care given to [facility]. Pt currently resting in bed with the call light within reach. Pt denies any other care needs at this time. Pt stable at this time.

## 2022-04-24 NOTE — FLOWSHEET NOTE
04/24/22 1445   Vital Signs   Temp 97.1 °F (36.2 °C)   Temp Source Oral   Pulse 87   Heart Rate Source Monitor   Resp 18   BP (!) 165/81   BP Location Right upper arm   MAP (Calculated) 109   Patient Position Up in chair   Level of Consciousness Alert (0)   MEWS Score 1   Pain Assessment   Pain Assessment 0-10   Pain Level 1   Patient's Stated Pain Goal 0 - No pain   Pain Location Hip   Pain Orientation Left   Pain Descriptors Aching;Discomfort   Functional Pain Assessment Prevents or interferes with many active not passive activities   Pain Type Acute pain   Pain Frequency Continuous   Pain Onset Gradual   Non-Pharmaceutical Pain Intervention(s) Emotional support       No additional needs after hygiene care.  Lizeth Redding RN

## 2022-04-24 NOTE — PROGRESS NOTES
PULMONARY PROGRESS NOTE  Hospital Day: 3   CC: Hypoxemia, dyspnea & elevated D-dimer    Subjective:   Unable to wean oxygen. Extreme agitation & verbal aggression toward staff. She is sarcastic this morning, but appears oriented. She does insist that no one is helping take care of her. IV line: Peripheral    PHYSICAL EXAM:   BP (!) 141/79   Pulse 82   Temp 97 °F (36.1 °C) (Oral)   Resp 16   Ht 5' 3\" (1.6 m)   Wt 153 lb 1.6 oz (69.4 kg)   SpO2 94%   BMI 27.12 kg/m² ' on 4 L, turned down to 1 L, 98%  Constitutional:  No acute distress. HENT:  Oropharynx is clear and moist.   Neck: No tracheal deviation present. Cardiovascular: Normal heart sounds. No lower extremity edema. Pulmonary/Chest: No wheezes. No rhonchi. No rales. No decreased breath sounds. No accessory muscle usage or stridor. Musculoskeletal: No cyanosis. No clubbing. Skin: Skin is warm and dry. Psychiatric: Normal mood and affect. Neurologic: speech fluent, alert and oriented, strength symmetric        Scheduled Meds:   hydroxychloroquine  200 mg Oral Daily    aspirin  81 mg Oral Daily    sodium chloride flush  5-40 mL IntraVENous 2 times per day    heparin (porcine)  5,000 Units SubCUTAneous TID     Continuous Infusions:   sodium chloride      sodium chloride 100 mL/hr at 04/23/22 1255     PRN Meds:  sodium chloride flush, sodium chloride, ondansetron **OR** ondansetron, acetaminophen **OR** acetaminophen, perflutren lipid microspheres    Labs:  CBC:   Recent Labs     04/22/22 2213 04/24/22  0431   WBC 8.5 6.0   HGB 12.2 12.6   HCT 36.2 37.4   MCV 88.3 88.7    154     BMP:   Recent Labs     04/22/22 2213 04/23/22  0436    140   K 4.6 4.2   CL 97* 98*   CO2 30 28   PHOS  --  4.5   BUN 40* 38*   CREATININE 1.8* 1.6*     Micro:   4/22/2022 SARS-CoV-2 & influenza not detected  BNP 9,235  (3,363 in Feb 2021)     Imaging:   CTPA 4/12/2021 @ [facility]  No significant mediastinal or hilar lymphadenopathy. There is a small irregular parenchymal density seen superior segment left lower lobe measuring approximately 6 x 13 mm. This could represent some focal consolidation or possibility of a mass lesion difficult to exclude. Clinical correlation and short interval follow-up CT is recommended in 3-6 months time versus possible PET scan   Patchy bibasilar airspace disease also noted. Centrilobular emphysema present. Patchy airspace disease also noted posteriorly right upper lobe. 1. No evidence of pulmonary emboli. .   2. Question focal consolidation versus a pulmonary mass superior segment left lower lobe. Short interval follow-up CT recommended versus possible PET scan. 3. Trace pleural effusions. 4. Bilateral areas of airspace disease as described above. 5. Questionable retrocrural lymphadenopathy versus prominent venous structure. 6. Marked compression deformity of T11 with milder compression deformity T12     CXR 4/22/2022  Trace bilateral pleural effusions. VQ Scan 4/23/22  Scattered heterogeneous perfusion is seen, with small defects at the lung   bases, that appear to be matched to the pleuroparenchymal opacity at the lung   bases on recent chest x-ray. 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 Evolving pneumonia is a differential possibility. ASSESSMENT:  · Acute hypoxemic respiratory failure, baseline RA  · Elevated d-dimer 5,194  (438 in Apr 2021)   · JENS   · Fall with left hip pain  · pAFIB, not on AC - currently in SR  · COPD/pulmonary emphysema - without exacerbation  · Pulmonary consolidation - 0.6 x 1.3 cm superior MARY - on outside imaging. I previously d/w patient. She is clear -- she does not wish to pursue any additional studies or imaging.   She knows this could be cancer, but wishes to do \"nothing I don't absolutely have to. \"  · Elevated SPAP 60 mmHg Echo 4/2017  · Dysphagia/esophageal dysmotility: with h/o abnormal manometry with EGJ outflow obstruction & features of type II achalasia. S/p EGD with botox 2/2021  · Never smoker per Epic    PLAN:  · Supplemental O2 to maintain SaO2 >92%; wean as tolerated    · Inhaled bronchodilators (Anoro at home per PCP note)   · D/C IVF  · Ortho has seen   · OK for prophylactic dosing of heparin - triple matched defect on VQ makes PE a far less likely diagnosis  · D/C planning  · D/W Dr. Mary Haskins      I contributed to updating portions of this encounter note.    Pancho Johnson PA-C on 4/24/22 at 7:56 AM EDT

## 2022-04-24 NOTE — PROGRESS NOTES
Patient bathed and teeth brushed along with hair washing. Patient very satisfied with hygiene care and apologized for being\"cranky\" as stated by patient. No other needs expressed. Call light in reach.  Hiram Gallegos RN

## 2022-04-24 NOTE — PROGRESS NOTES
Writer called to reposition the patient due to patient agitation and request. Pt. C/o midsternal pain, unable to rate. Pt. States, \"I have something stuck in my throat\". Pt. Is confused and disoriented to person, place, and time. o2 sat at 81% on 2ln/c, o2 increased to 4l n/c. Pt. Presents with frequent coughing, followed by emesis of moderate food amount with frothy phlegm. CXR ordered stat and IVF dc'd at this time. Dr. Mary Haskins summoned to the room for patient evaluation. [facility] is reaching out to Dr. Eliza Flores to review change in patient status. See new MD orders per [facility].  Kalie Cardoso Clinical

## 2022-04-24 NOTE — PROGRESS NOTES
Shift report given to nurse Gustafson at bedside. Patient care handed off in stable condition at this time.  Hardik Weaver RN

## 2022-04-24 NOTE — PROGRESS NOTES
Patient screaming into lovell that she needs help and no one will help her. Writer entered room and offered to help patient reposition, get a drink or snack, or find something different to watch on TV. Patient refused and again became verbally aggressive. Patient insisting that we want her to die and are hateful and do not do anything for her. Patient is worried she is wet and does not understand what the Pure wick is or how it works. PCA present and checked patient to verify she was not wet. Patient said we were liars and trying to kill her. When writer tried to explain the pure wick Patient said to shut up and get out.

## 2022-04-24 NOTE — PROGRESS NOTES
Shift assessment completed. See flow sheet. Medications given. Patient is A&O x4 with periods of confusion. Patient states that she hasn't seen a Doctor since she has been here and does not remember talking to one today. Upon entrance to room Patient had Nasal Canula Oxygen off and was resistant to wearing it. Oxygen was 86%. Oxygen was reapplied and increased to 4L. Oxygen came up to 96%. Vital signs are stable. Patient is easily agitated and frequently upset. Writer has attempted multiple time to meet patients needs and comfort her and will continue to do so. Call light within reach. Will continue to monitor.

## 2022-04-24 NOTE — PROGRESS NOTES
Patient requested a drink. Writer brought her Ice water and offered a snack. Patient verbally aggressive stating that we haven't done anything for her all night and we are all lazy. She refused a snack. PCA present to help reposition patient. Patient became very agitated and told us to leave her sheila and go take a nap like we were already doing.

## 2022-04-24 NOTE — PROGRESS NOTES
O2 sat now at 97% on 4l, o2 decreased to 2l n/c. Dr. Sarbjit Maurer reviewed chest xray and offered no further orders at this time. Pt. Present with less anxiety and frustration post-emesis. IVF remain off per verbal MD order.  Nathaly Vale RN Clinical

## 2022-04-25 VITALS
RESPIRATION RATE: 16 BRPM | DIASTOLIC BLOOD PRESSURE: 79 MMHG | BODY MASS INDEX: 26.8 KG/M2 | WEIGHT: 151.25 LBS | HEART RATE: 99 BPM | OXYGEN SATURATION: 92 % | SYSTOLIC BLOOD PRESSURE: 137 MMHG | HEIGHT: 63 IN | TEMPERATURE: 98 F

## 2022-04-25 LAB
ALBUMIN SERPL-MCNC: 3.3 G/DL (ref 3.4–5)
ANION GAP SERPL CALCULATED.3IONS-SCNC: 10 MMOL/L (ref 3–16)
ANION GAP SERPL CALCULATED.3IONS-SCNC: 11 MMOL/L (ref 3–16)
BASOPHILS ABSOLUTE: 0 K/UL (ref 0–0.2)
BASOPHILS RELATIVE PERCENT: 0.5 %
BUN BLDV-MCNC: 22 MG/DL (ref 7–20)
BUN BLDV-MCNC: 23 MG/DL (ref 7–20)
CALCIUM SERPL-MCNC: 8.7 MG/DL (ref 8.3–10.6)
CALCIUM SERPL-MCNC: 8.7 MG/DL (ref 8.3–10.6)
CHLORIDE BLD-SCNC: 100 MMOL/L (ref 99–110)
CHLORIDE BLD-SCNC: 101 MMOL/L (ref 99–110)
CO2: 29 MMOL/L (ref 21–32)
CO2: 29 MMOL/L (ref 21–32)
CREAT SERPL-MCNC: 0.9 MG/DL (ref 0.6–1.2)
CREAT SERPL-MCNC: 0.9 MG/DL (ref 0.6–1.2)
EOSINOPHILS ABSOLUTE: 0.3 K/UL (ref 0–0.6)
EOSINOPHILS RELATIVE PERCENT: 4.6 %
GFR AFRICAN AMERICAN: >60
GFR AFRICAN AMERICAN: >60
GFR NON-AFRICAN AMERICAN: 59
GFR NON-AFRICAN AMERICAN: 59
GLUCOSE BLD-MCNC: 94 MG/DL (ref 70–99)
GLUCOSE BLD-MCNC: 94 MG/DL (ref 70–99)
HCT VFR BLD CALC: 32.6 % (ref 36–48)
HEMOGLOBIN: 11.1 G/DL (ref 12–16)
LYMPHOCYTES ABSOLUTE: 0.9 K/UL (ref 1–5.1)
LYMPHOCYTES RELATIVE PERCENT: 15.7 %
MCH RBC QN AUTO: 30.2 PG (ref 26–34)
MCHC RBC AUTO-ENTMCNC: 34 G/DL (ref 31–36)
MCV RBC AUTO: 88.6 FL (ref 80–100)
MONOCYTES ABSOLUTE: 1 K/UL (ref 0–1.3)
MONOCYTES RELATIVE PERCENT: 18.7 %
NEUTROPHILS ABSOLUTE: 3.3 K/UL (ref 1.7–7.7)
NEUTROPHILS RELATIVE PERCENT: 60.5 %
PDW BLD-RTO: 14.5 % (ref 12.4–15.4)
PHOSPHORUS: 2.4 MG/DL (ref 2.5–4.9)
PLATELET # BLD: 142 K/UL (ref 135–450)
PMV BLD AUTO: 6.7 FL (ref 5–10.5)
POTASSIUM REFLEX MAGNESIUM: 3.8 MMOL/L (ref 3.5–5.1)
POTASSIUM SERPL-SCNC: 3.7 MMOL/L (ref 3.5–5.1)
RBC # BLD: 3.68 M/UL (ref 4–5.2)
SARS-COV-2, NAAT: NOT DETECTED
SODIUM BLD-SCNC: 139 MMOL/L (ref 136–145)
SODIUM BLD-SCNC: 141 MMOL/L (ref 136–145)
WBC # BLD: 5.5 K/UL (ref 4–11)

## 2022-04-25 PROCEDURE — 80069 RENAL FUNCTION PANEL: CPT

## 2022-04-25 PROCEDURE — 6360000002 HC RX W HCPCS: Performed by: INTERNAL MEDICINE

## 2022-04-25 PROCEDURE — 97535 SELF CARE MNGMENT TRAINING: CPT

## 2022-04-25 PROCEDURE — 87635 SARS-COV-2 COVID-19 AMP PRB: CPT

## 2022-04-25 PROCEDURE — 99232 SBSQ HOSP IP/OBS MODERATE 35: CPT | Performed by: INTERNAL MEDICINE

## 2022-04-25 PROCEDURE — 6370000000 HC RX 637 (ALT 250 FOR IP): Performed by: INTERNAL MEDICINE

## 2022-04-25 PROCEDURE — 2580000003 HC RX 258: Performed by: INTERNAL MEDICINE

## 2022-04-25 PROCEDURE — 97166 OT EVAL MOD COMPLEX 45 MIN: CPT

## 2022-04-25 PROCEDURE — 97161 PT EVAL LOW COMPLEX 20 MIN: CPT

## 2022-04-25 PROCEDURE — 97530 THERAPEUTIC ACTIVITIES: CPT

## 2022-04-25 PROCEDURE — 2700000000 HC OXYGEN THERAPY PER DAY

## 2022-04-25 PROCEDURE — 99239 HOSP IP/OBS DSCHRG MGMT >30: CPT | Performed by: INTERNAL MEDICINE

## 2022-04-25 PROCEDURE — 36415 COLL VENOUS BLD VENIPUNCTURE: CPT

## 2022-04-25 PROCEDURE — 85025 COMPLETE CBC W/AUTO DIFF WBC: CPT

## 2022-04-25 RX ORDER — FUROSEMIDE 40 MG/1
40 TABLET ORAL DAILY
Status: DISCONTINUED | OUTPATIENT
Start: 2022-04-25 | End: 2022-04-25 | Stop reason: HOSPADM

## 2022-04-25 RX ORDER — ALBUTEROL SULFATE 90 UG/1
2 AEROSOL, METERED RESPIRATORY (INHALATION) EVERY 6 HOURS PRN
Status: DISCONTINUED | OUTPATIENT
Start: 2022-04-25 | End: 2022-04-25 | Stop reason: HOSPADM

## 2022-04-25 RX ORDER — DIMETHICONE, OXYBENZONE, AND PADIMATE O 2; 2.5; 6.6 G/100G; G/100G; G/100G
STICK TOPICAL
Status: DISCONTINUED
Start: 2022-04-25 | End: 2022-04-25 | Stop reason: HOSPADM

## 2022-04-25 RX ORDER — POTASSIUM CHLORIDE 20 MEQ/1
20 TABLET, EXTENDED RELEASE ORAL ONCE
Status: COMPLETED | OUTPATIENT
Start: 2022-04-25 | End: 2022-04-25

## 2022-04-25 RX ORDER — BETAMETHASONE DIPROPIONATE 0.5 MG/G
CREAM TOPICAL 2 TIMES DAILY
Status: DISCONTINUED | OUTPATIENT
Start: 2022-04-25 | End: 2022-04-25 | Stop reason: HOSPADM

## 2022-04-25 RX ADMIN — BETAMETHASONE DIPROPIONATE: 0.5 CREAM TOPICAL at 13:28

## 2022-04-25 RX ADMIN — METOPROLOL TARTRATE 25 MG: 25 TABLET, FILM COATED ORAL at 13:25

## 2022-04-25 RX ADMIN — SALINE NASAL SPRAY 1 SPRAY: 1.5 SOLUTION NASAL at 08:53

## 2022-04-25 RX ADMIN — POTASSIUM CHLORIDE 20 MEQ: 1500 TABLET, EXTENDED RELEASE ORAL at 13:26

## 2022-04-25 RX ADMIN — ASPIRIN 81 MG 81 MG: 81 TABLET ORAL at 08:53

## 2022-04-25 RX ADMIN — HEPARIN SODIUM 5000 UNITS: 5000 INJECTION INTRAVENOUS; SUBCUTANEOUS at 08:54

## 2022-04-25 RX ADMIN — Medication 10 ML: at 08:53

## 2022-04-25 RX ADMIN — FUROSEMIDE 40 MG: 40 TABLET ORAL at 13:26

## 2022-04-25 RX ADMIN — HYDROXYCHLOROQUINE SULFATE 200 MG: 200 TABLET ORAL at 08:53

## 2022-04-25 NOTE — PROGRESS NOTES
PATIENT RESTED QUIETLY THROUGHOUT THE NIGHT, HOWEVER SHE DID HAVE INSTANCE SHE GOT CONFUSED AND FRUSTRATED, STATED SHE DIDN'T KNOW WHERE SHE WAS AND WAS CONFUSED OF THE TIME, SHE THOUGHT IT WAS DAYTIME. SHE WAS EASILY REDIRECTED. REMAINS ON 2L OF O2 VIA NASAL CANULA, VSS. IV SITE IS PATENT. NO SIGNS OF DISTRESS NOTED. SHE DID HAVE SOME COMPLAINTS OF LEFT LEG SPASMS/PAIN, TYLENOL PO WAS GIVEN. SINUS ON TELE.

## 2022-04-25 NOTE — PROGRESS NOTES
Inpatient Physical Therapy Evaluation and Treatment    Unit: PCU  Date:  4/25/2022  Patient Name:    Bekah Park  Admitting diagnosis:  Pleural effusion [J90]  JENS (acute kidney injury) ([address] [N17.9]  Acute respiratory failure with hypoxia (HCC) [J96.01]  Elevated d-dimer [R79.89]  Contusion of left hip, initial encounter [S70.02XA]  Admit Date:  4/22/2022  Precautions/Restrictions/WB Status/ Lines/ Wounds/ Oxygen: Fall risk, Bed/chair alarm, Lines -IV, Supplemental O2 (2L) and Purewick catheter, Telemetry, Continuous pulse oximetry and WB Restrictions (WBAT LLE)    Treatment Time:  9:10 - 9:45  Treatment Number:  1   Timed Code Treatment Minutes: 25 minutes  Total Treatment Minutes:  35  minutes    Patient Goals for Therapy: \" to walk \"          Discharge Recommendations: SNF  DME needs for discharge: defer to facility       Therapy recommendation for EMS Transport: can transport by wheelchair    Therapy recommendations for staff:   Assist of 1 with use of rolling walker (RW) and gait belt for all transfers and ambulation to/from [facility]  to/from chair    History of Present Illness:   Per Albin Hopper:  Bekah Park is a 80 y.o. female history of arthritis, atrial fibrillation, COPD, hypertension who presents via EMS from her skilled nursing facility for evaluation after a fall. Patient notes that she was at her skilled nursing facility in her room, she was sorting through a delivery that she had just gotten to her room and she lost her footing and fell back onto her bottom. She denies hitting her head no loss of consciousness. She notes that since then she has had pain with weightbearing to the left hip. She denies distal numbness tingling or weakness. She denies chest pain. She notes chronic obstructive pulmonary disease at with worsening shortness of breath over the last week. She had chest x-ray yesterday and she notes that it was clear.   She does not typically wear oxygen but currently is requiring 2 L nasal cannula. She endorses being compliant with all of her medications. She notes chronic lower extremity edema that is not worse than typical.  She denies any nausea vomiting or abdominal pain and has no other acute concerns or complaints today. She is had acetaminophen for her hip pain with improvement but not complete resolution. Home Health S4 Level Recommendation:  NA  AM-PAC Mobility Score       AM-PAC Inpatient Mobility without Stair Climbing Raw Score : 10    Preadmission Environment    Pt. Lives in  [address]   Steps to enter first floor: No steps  Steps to second floor: N/A  Bathroom: walk in shower, comfort height toilet and grab bars  Equipment owned: RW, rollator, wc (manual), shower chair/bench, SPC, lift chair, hospital bed and reacher    Preadmission Status:  Pt. Able to drive: No  Pt Fully independent with ADLs: No  Pt. Required assistance from family for: Bathing, Cleaning, Cooking, Dressing and Laundry   Pt. independent for transfers and gait and walked with Rollator   Pt reports she sometimes uses WC to get to dining lovell for meals. History of falls Yes   Has PT/OT 2x/week. Pain   No  Location:   Rating: NA /10  Pain Medicine Status: Denies need    Cognition    A&O Person, Place, Time and Situation, knows it is April 2022 but not exact date  Able to follow 2 step commands    Subjective  Patient lying supine in bed with no family present. Pt agreeable to this PT eval & tx. Upper Extremity ROM/Strength  Please see OT evaluation.       Lower Extremity ROM / Strength   AROM WFL: Yes  ROM limitations:     Strength Assessment (measured on a 0-5 scale):  R LE   Quad   4   Ant Tib  4   Hamstring 4   Iliopsoas 3+  L LE  Quad   3+   Ant Tib  3+   Hamstring 3+   Iliopsoas 3-    Lower Extremity Sensation    WFL    Lower Extremity Proprioception:   NT    Coordination and Tone  NT    Balance  Sitting:  Good ; Supervision  Comments: EOB    Standing: Fair ; Min A   Comments: with RW    Bed Mobility   Supine to Sit:    Mod A  and 2 persons  Sit to Supine:   Not Tested  Rolling: Mod A   Scooting in sitting: Mod A   Scooting in supine:  Not Tested    Transfer Training     Sit to stand: Mod A   Stand to sit:   Mod A   Bed to Chair:   Min A  with use of gait belt and rolling walker (RW)    Gait gait completed as indicated below  Distance:      5 ft  Deviations (firm surface/linoleum):  decreased vanessa, increased JONN, forward flexed posture, shuffles, step to pattern and decreased stance time on left  Assistive Device Used:    gait belt and rolling walker (RW)  Level of Assist:    Min A   Comment: fearful of pain with wt bearing on LLE    Stair Training deferred, pt does not have stairs in home environment    Activity Tolerance   Pt completed therapy session with No adverse symptoms noted w/activity   Supine at rest:  SpO2: 94% on 2L  HR: 100 bpm    Sitting EOB:  SpO2: 89%-91%  HR: 103 bpm    After transfer:  SpO2: 87%  HR: 109 bpm  *Recovered to 93% with PLB and seated rest    Positioning Needs   Pt up in chair, alarm set, positioned in proper neutral alignment and pressure relief provided. Call light provided and all needs within reach    Exercises Initiated  Emil deferred secondary to treatment focus on functional mobility  NA    Other  None. Patient/Family Education   Pt educated on role of inpatient PT, POC, importance of continued activity, DC recommendations, safety awareness, transfer techniques, pursed lip breathing, pacing activity and calling for assist with mobility. Assessment  Pt seen for Physical Therapy evaluation in acute care setting. Pt demonstrated decreased Activity tolerance, Balance, Safety and Strength as well as decreased independence with Ambulation, Bed Mobility  and Transfers.      Recommending SNF upon discharge as patient functioning well below baseline, demonstrates good rehab potential and unable to return home due to limited or no family support, burden of care beyond caregiver ability and limited safety awareness. Goals : To be met in 3 visits:  1). Independent with LE Ex x 10 reps    To be met in 6 visits:  1). Supine to/from sit: SBA  2). Sit to/from stand: CGA  3). Bed to chair: CGA  4). Gait: Ambulate 50 ft.  with  CGA and use of rolling walker (RW)  5). Tolerate B LE exercises 3 sets of 10-15 reps    Rehabilitation Potential: Good  Strengths for achieving goals include:   Pt motivated and Pt cooperative   Barriers to achieving goals include:    No Barriers    Plan    To be seen 3-5 x / week  while in acute care setting for therapeutic exercises, bed mobility, transfers, progressive gait training, balance training, and family/patient education. Signature: Elvis Rucker PT, DPT, OMT-C  #[id number]      If patient discharges from this facility prior to next visit, this note will serve as the Discharge Summary.

## 2022-04-25 NOTE — PROGRESS NOTES
[facility] Pt left facility in stable condition to Rehab with all of their personal belongings. [facility], dayshift called rehab facility and gave report.

## 2022-04-25 NOTE — CARE COORDINATION
Case Management Assessment  Initial Evaluation      Patient Name: Bekah Park  YOB: 1930  Diagnosis: Pleural effusion [J90]  JENS (acute kidney injury) ([facility] [address] [N17.9]  Acute respiratory failure with hypoxia ([facility] [address] [J96.01]  Elevated d-dimer [R79.89]  Contusion of left hip, initial encounter [S70.02XA]  Date / Time: 4/22/2022  7:36 PM    Admission status/Date:INPT 4/23/22  Chart Reviewed: Yes      Patient Interviewed: Yes   Family Interviewed:  Yes - pt Brooke de la rosa      Hospitalization in the last 30 days:  No      Health Care Decision Maker :  Pt Brooke de la rosa   (CM - must 1st enter selection under Navigator - emergency contact- Health Care Decision Maker Relationship and pick relationship)   Who do you trust or have selected to make healthcare decisions for you      Met with: pt at bedside.       Current PCP: [address]  Medicare  Precert required for SNF : Y, N          3 night stay required - Y, N    ADLS  Support Systems/Care Needs: Family Members  Transportation: family    Meal Preparation: assisted living    Housing  Living Arrangements: home in [place] [address] apartment  Steps: n/a  Intent for return to present living arrangements: STR  Identified Issues: -    [address] : No Agency:(Services)     Passport/Waiver : No  :                      Phone Number:    Passport/Waiver Services: -          Durable Medical Equiptment   DME Provider: -  Equipment: -  Walker_X__Cane_X__RTS___ BSC___Shower Chair___Hospital Bed___W/C_for distance___Other________  02 at ____Liter(s)---wears(frequency)_______ HHN ___ CPAP___ BiPap___   N/A____      Home O2 Use :  No    If No for home O2---if presently on O2 during hospitalization:  No  if yes CM to follow for potential DC O2 need  Informed of need for care provider to bring portable home O2 tank on day of discharge for nursing to connect prior to leaving:   Not Indicated  Verbalized agreement/Understanding:   Not Indicated    Community Service Affiliation  Dialysis:  No    · Agency:  · Location:  · Dialysis Schedule:  · Phone:   · Fax: Other Community Services: (ex:PT/OT,Mental Health,Wound Clinic, Cardio/Pul [address])    DISCHARGE PLAN: Explained Case Management role/services. CM reviewed chart and met with pt. Pt reports to live at home alone in an assisted living apartment. Noted PT/OT recommendation for STR. Pt is agreeable and asks CM to call her son, Corina Devries, to assist with choosing a facility. CASSI spoke with pt son, Corina Devries, who states that his first choice is [facility] and 2nd choice is [facility]. Referral called to Elli at [facility] who states she will review pt information and will return call. 0: CASSI spoke with Saud Ortez at [facility] who states that they cannot accomodate pt at this time. Referral called to [facility] at this time. Await return call.

## 2022-04-25 NOTE — PROGRESS NOTES
Progress Note    Admit Date:  4/22/2022    Subjective:  Ms. Gifty Stanley is calm now. On 2 L of O2    Objective:   BP (!) 154/77   Pulse 88   Temp 98 °F (36.7 °C) (Oral)   Resp 18   Ht 5' 3\" (1.6 m)   Wt 151 lb 4 oz (68.6 kg)   SpO2 91%   BMI 26.79 kg/m²          Intake/Output Summary (Last 24 hours) at 4/25/2022 0850  Last data filed at 4/25/2022 0849  Gross per 24 hour   Intake 180 ml   Output 1650 ml   Net -1470 ml       Physical Exam:    General appearance: alert, appears stated age and cooperative  Head: Normocephalic, without obvious abnormality, atraumatic  Eyes: conjunctivae/corneas clear. PERRL, EOM's intact.   Neck: no adenopathy, no carotid bruit, no JVD, supple, symmetrical, trachea midline and thyroid not enlarged, symmetric, no tenderness/mass/nodules  Lungs: clear to auscultation bilaterally  Heart: regular rate and rhythm, S1, S2 normal, no murmur, click, rub or gallop  Abdomen: soft, non-tender; bowel sounds normal; no masses,  no organomegaly  Extremities: extremities normal, atraumatic, no cyanosis or edema  Pulses: 2+ and symmetric  Skin: Skin color, texture, turgor normal. No rashes or lesions  Neurologic: Grossly normal    Scheduled Meds:   hydroxychloroquine  200 mg Oral Daily    aspirin  81 mg Oral Daily    sodium chloride flush  5-40 mL IntraVENous 2 times per day    heparin (porcine)  5,000 Units SubCUTAneous TID       Continuous Infusions:   sodium chloride         PRN Meds:  sodium chloride, sodium chloride flush, sodium chloride, ondansetron **OR** ondansetron, acetaminophen **OR** acetaminophen, perflutren lipid microspheres      Data:  CBC:   Recent Labs     04/22/22 2213 04/24/22  0431 04/25/22  0520   WBC 8.5 6.0 5.5   HGB 12.2 12.6 11.1*   HCT 36.2 37.4 32.6*   MCV 88.3 88.7 88.6    154 142     BMP:   Recent Labs     04/22/22 2213 04/23/22  0436 04/23/22  0436 04/24/22  0825 04/24/22  0826 04/25/22  0519 04/25/22  0520   NA   < > 140   < > 138 139 139 141   K   < > 4.2   < > 4.1 4.1 3.7 3.8   CL   < > 98*   < > 99 101 100 101   CO2   < > 28   < > 31 29 29 29   PHOS  --  4.5  --  3.3  --  2.4*  --    BUN   < > 38*   < > 27* 27* 22* 23*   CREATININE   < > 1.6*   < > 1.0 1.0 0.9 0.9    < > = values in this interval not displayed. LIVER PROFILE:   Recent Labs     04/22/22  2213   AST 14*   ALT 9*   BILITOT 0.4   ALKPHOS 69     PT/INR:   Recent Labs     04/23/22  0055   PROTIME 11.5   INR 1.02     Cultures:   Results for José Grimes (MRN [id number]) as of 4/23/2022 10:40    Ref. Range 4/22/2022 23:50   INFLUENZA A Latest Ref Range: NOT DETECTED  NOT DETECTED   INFLUENZA B Latest Ref Range: NOT DETECTED  NOT DETECTED   SARS-CoV-2 RNA, RT PCR Latest Ref Range: NOT DETECTED  NOT DETECTED   Results for José Grimes (MRN [id number]) as of 4/23/2022 10:40    Ref. Range 4/23/2022 00:55   D-Dimer, Quant Latest Ref Range: 0 - 229 ng/mL DDU 5194 (H)             XR CHEST PORTABLE   Final Result   Small bilateral pleural effusions, with suspected overlying atelectasis. Evolving pneumonia is a differential possibility. 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 XR CHEST (2 VW)   Final Result   Trace bilateral pleural effusions. Assessment:  Principal Problem:    Acute respiratory failure with hypoxia (HCC)  Active Problems:    JENS (acute kidney injury) ([facility] [address]    Atrial fibrillation (HCC)    COPD (chronic obstructive pulmonary disease) (HCC)    Hypertension    Arthritis    Hypoxia    Fall    Contusion of left hip    Elevated d-dimer  Resolved Problems:    * No resolved hospital problems. *      Plan:    Acute respiratory failure. D-dimer is elevated.   COPD no exacerbation  Left upper lobe pulmonary consolidation-patient does not wish to pursue any additional studies or imaging for this. Pulmonology was consulted. VQ scan shows triple matched defect. Dysphagia/esophageal dysmotility: with h/o abnormal manometry with EGJ outflow obstruction & features of type II achalasia. S/p EGD with botox 2/2021    #Mild JENS resolved. Stop IVF.     #Paroxysmal atrial fibrillation. In sinus rhythm. not on anticoagulation. Restart metoprolol 25 twice daily. Takes 50 twice daily at home.     #Left hip pain. She has had a left hip replacement. She had pain. X-ray negative. Ortho consult obtained. I ordered Norco for pain. Cannot use NSAIDs due to JENS. Pain some better. Continue Plaquenil, 200 mg daily. She takes it for 'arthritis'     #Hypertension. Blood pressure slightly elevated.    Restart metoprolol at 25 twice daily.      #On heparin for DVT prophylaxis.      ot/pt  Needs SNF       Zeynep Smith MD, MD 4/25/2022 8:50 AM

## 2022-04-25 NOTE — PROGRESS NOTES
Inpatient Occupational Therapy  Evaluation and Treatment    Unit: PCU  Date:  4/25/2022  Patient Name:    Edith Gallegos  Admitting diagnosis:  Pleural effusion [J90]  JENS (acute kidney injury) ([facility] [address] [N17.9]  Acute respiratory failure with hypoxia (HCC) [J96.01]  Elevated d-dimer [R79.89]  Contusion of left hip, initial encounter [S70.02XA]  Admit Date:  4/22/2022  Precautions/Restrictions/WB Status/ Lines/ Wounds/ Oxygen:   Fall risk, Bed/chair alarm, Lines -IV, Supplemental O2 (2L) and Purewick catheter, Telemetry, Continuous pulse oximetry and WB Restrictions (WBAT LLE    Treatment Time:  910- 948  Treatment Number: 1   Timed code treatment [id number] minutes   Total Treatment minutes:   38  minutes    Patient Goals for Therapy:  \" not stated \"      Discharge Recommendations: SNF  DME needs for discharge: defer to facility       Therapy recommendations for staff:   Assist of 1 with use of rolling walker (RW) for all transfers to/from [facility]  to/from chair with gait belt     History of Present Illness: per H&P on 4-22-22 Salty COLES    80 y.o. female history of arthritis, atrial fibrillation, COPD, hypertension who presents via EMS from her skilled nursing facility for evaluation after a fall. Patient notes that she was at her skilled nursing facility in her room, she was sorting through a delivery that she had just gotten to her room and she lost her footing and fell back onto her bottom. She denies hitting her head no loss of consciousness. She notes that since then she has had pain with weightbearing to the left hip. She denies distal numbness tingling or weakness. She denies chest pain. She notes chronic obstructive pulmonary disease at with worsening shortness of breath over the last week. She had chest x-ray yesterday and she notes that it was clear. She does not typically wear oxygen but currently is requiring 2 L nasal cannula. She endorses being compliant with all of her medications.   She notes chronic lower extremity edema that is not worse than typical.  She denies any nausea vomiting or abdominal pain and has no other acute concerns or complaints today. She is had acetaminophen for her hip pain with improvement but not complete resolution  Home Health S4 Level Recommendation:  NA  AM-PAC Score: 15  Preadmission Environment    Pt. Lives in  [address]   Steps to enter first floor: No steps  Steps to second floor: N/A  Bathroom: walk in shower, comfort height toilet and grab bars  Equipment owned: RW, rollator, wc (manual), shower chair/bench, SPC, lift chair, hospital bed and reacher     Preadmission Status:  Pt. Able to drive: No  Pt Fully independent with ADLs: No  Pt. Required assistance from family for: Bathing, Cleaning, Cooking, Dressing and Laundry   Pt. independent for transfers and gait and walked with Rollator   Pt reports she sometimes uses WC to get to dining lovell for meals. History of falls Yes   Has PT/OT 2x/week.     Pain   No  Location:   Rating: NA /10  Pain Medicine Status: Denies need     Cognition    A&O Person, Place, Time and Situation, knows it is April 2022 but not exact date  Able to follow 2 step commands        Subjective  Patient lying supine in bed with no family present. Pt agreeable to this OT eval & tx. Upper Extremity ROM:    Impaired R shoulder flex limited to 70 degrees   Impaired L shoulder flex limited to 90s degrees     Upper Extremity Strength:    Bilateral shoulders 3-/5     Upper Extremity Sensation    WFL    Upper Extremity Proprioception:  WFL    Coordination and Tone  WFL    Balance  Functional Sitting Balance:  WFL  Functional Standing Balance:Impaired    Bed mobility:    Supine to sit:   Min A   Sit to supine:   Not Tested  Rolling:    Not Tested  Scooting in sitting:   Mod A on the edge of the bed   Scooting to head of bed:   Not Tested    Bridging:   Not Tested    Transfers:    Sit to stand:  Min A   Stand to sit:  CGA  Bed to chair: Min A with RW - difficulty bearing wt on the left foot   Standard toilet: Not Tested  Bed to [facility]:  Not Tested    Dressing:      UE:   Not Tested  LE:    Min A to don pants     Bathing:    UE:  Not Tested  LE:  Not Tested    Eating:   Not Tested    Toileting:  Not Tested    Activity Tolerance   Pt completed therapy session with Pain noted with ambulation  Supine at rest:  SpO2: 94% on 2L  HR: 100 bpm     Sitting EOB:  SpO2: 89%-91%  HR: 103 bpm     After transfer:  SpO2: 87%  HR: 109 bpm  *Recovered to 93% with PLB and seated rest  Positioning Needs:   Pt up in chair, alarm set, positioned in proper neutral alignment and pressure relief provided. Exercise / Activities Initiated:   N/A    Patient/Family Education:   Role of OT    Assessment of Deficits: Pt seen for Occupational therapy evaluation in acute care setting. Pt demonstrated decreased Activity tolerance, ADLs, Balance , Bed mobility, ROM, Safety Awareness, Strength and Transfers. Pt functioning below baseline and will likely benefit from skilled occupational therapy services to maximize safety and independence. Goal(s) : To be met in 3 Visits:  1). Bed to toilet/BSC: CGA with RW    To be met in 5 Visits:  1). Supine to/from Sit:  CGA  2). Upper Body Bathing:   Supervision  3). Lower Body Bathing:   Min A   4). Upper Body Dressing:  Supervision and CGA  5). Lower Body Dressing:  CGA  6). Pt to demonstrate UE exs x 15 reps with minimal cues    Rehabilitation Potential:  Fair for goals listed above. Strengths for achieving goals include: Pt cooperative  Barriers to achieving goals include:  Complexity of condition     Plan: To be seen 3-5 x/wk while in acute care setting for therapeutic exercises, bed mobility, transfers, dressing, bathing, family/patient education, ADL/IADL retraining, energy conservation training.      Geetha Medicine OTR/L [id number]          If patient discharges from this facility prior to next visit, this note will serve as the Discharge Summary

## 2022-04-25 NOTE — PROGRESS NOTES
Shift assessment complete. See flow sheet. Patients head-toe complete, active bowel sound noted in all four quadrants. No further needs noted at this time. Call light and bedside table are within reach. The bed is locked and is in the lowest position.

## 2022-04-25 NOTE — CARE COORDINATION
DISCHARGE ORDER  Date/Time 2022 4:38 PM  Completed by: Octavia Duran, RN, Case Management    Patient Name: Desiree Bence    : 1930      Admit order Date and Status:INPT 2022  Noted discharge order. (verify MD's last order for status of admission/Traditional Medicare 3 [place] Inpatient qualifying stay required for SNF)    Confirmed discharge plan with:              Patient:  Yes              When pt confirms DC plan does any support person need to be contacted by CM Yes if yes who__pt son,poa: Viji Finn Purcell____                      Discharge to Facility: [facility] number for staff giving report: [phone]   Pre-certification completed: [facility] Exemption Notification (HENS) completed: yes   Discharge orders and Continuity of Care faxed to facility:  facility to obtain from [facility]      Transportation:               Medical Transport explained with choice list offered to pt/family. Choice:(no preference)  Agency used: [employer]   time:   1900      Pt/family/Nursing/Facility aware of  time:   Yes Names: pt, nurse, , left vmm for son, facility  Ambulance form completed:  yes:      Comments:-    Pt is being d/c'd to [facility] today. Pt's O2 sats are 92% on 2lpm.    Discharge timeout done with PERSON [facility]. All discharge needs and concerns addressed. Discharging nurse to complete GEORGE, reconcile AVS, and place final copy with patient's discharge packet. Discharging RN to ensure that written prescriptions for  Level II medications are sent with patient to the facility as per protocol.

## 2022-04-25 NOTE — DISCHARGE SUMMARY
Name:  Bryson Banda  Room:  /6138-18  MRN:    [id number]    Discharge Summary      This discharge summary is in conjunction with a complete physical exam done on the day of discharge. Discharging Physician: Dr. Elfego Kolb: 4/22/2022  Discharge:   04/25/22     HPI taken from admission H&P:    Patient is a 27-year-old white female who lives in assisted living. She is a somewhat of a poor historian. She was also very agitated with her care last night and initially did not want to talk to anybody. After some time she started answering my questions. She fell at her assisted living place. There is no loss of consciousness. She complained of left hip pain. Patient was found to be hypoxic. She was also found to have a mild JENS. Patient's had prior left hip replacement surgery. X-ray of the left hip in ER showed no fracture. Work-up also showed an elevated D-dimer. She denies any chest pain. She denies any abdominal pain nausea vomiting. There is no head trauma. She is not on oxygen at baseline.       Diagnoses this Admission and Hospital Course   Acute respiratory failure.   D-dimer is elevated. COPD no exacerbation  Left upper lobe pulmonary consolidation-patient does not wish to pursue any additional studies or imaging for this. Pulmonology was consulted.    VQ scan shows triple matched defect.     Dysphagia/esophageal dysmotility: with h/o abnormal manometry with EGJ outflow obstruction & features of type II achalasia. S/p EGD with botox 2/2021     #Mild JENS resolved. Stop IVF.     #Paroxysmal atrial fibrillation.  In sinus rhythm. not on anticoagulation.   Restart metoprolol 25 twice daily. Takes 50 twice daily at home.     #Left hip pain.  She has had a left hip replacement.  She had pain.  X-ray negative.  Ortho consult obtained.  I ordered Norco for pain.  Cannot use NSAIDs due to JENS. Pain some better. Continue Plaquenil, 200 mg daily.   She takes it for 'arthritis'     #Hypertension.  Blood pressure slightly elevated. Restart metoprolol at 25 twice daily.      #On heparin for DVT prophylaxis.       ot/pt  Needs SNF        Procedures (Please Review Full Report for Details)  None     Consults    Pulmonary       Physical Exam at Discharge:    /79   Pulse 99   Temp 98 °F (36.7 °C) (Oral)   Resp 16   Ht 5' 3\" (1.6 m)   Wt 151 lb 4 oz (68.6 kg)   SpO2 92%   BMI 26.79 kg/m²     See progress note day of discharge     CBC:   Recent Labs     04/22/22 2213 04/24/22  0431 04/25/22  0520   WBC 8.5 6.0 5.5   HGB 12.2 12.6 11.1*   HCT 36.2 37.4 32.6*   MCV 88.3 88.7 88.6    154 142     BMP:   Recent Labs     04/22/22 2213 04/23/22 0436 04/23/22  0436 04/24/22  0825 04/24/22  0826 04/25/22  0519 04/25/22  0520   NA   < > 140   < > 138 139 139 141   K   < > 4.2   < > 4.1 4.1 3.7 3.8   CL   < > 98*   < > 99 101 100 101   CO2   < > 28   < > 31 29 29 29   PHOS  --  4.5  --  3.3  --  2.4*  --    BUN   < > 38*   < > 27* 27* 22* 23*   CREATININE   < > 1.6*   < > 1.0 1.0 0.9 0.9    < > = values in this interval not displayed. LIVER PROFILE:   Recent Labs     04/22/22 2213   AST 14*   ALT 9*   BILITOT 0.4   ALKPHOS 69     PT/INR:   Recent Labs     04/23/22  0055   PROTIME 11.5   INR 1.02     APTT:   Recent Labs     04/23/22  0055   APTT 29.5     UA:  Recent Labs     04/22/22  2300   COLORU Yellow   PHUR 6.0   CLARITYU Clear   SPECGRAV 1.010   LEUKOCYTESUR Negative   UROBILINOGEN 0.2   BILIRUBINUR Negative   BLOODU Negative   GLUCOSEU Negative         CULTURES   SARS-CoV-2 RNA, RT PCR NOT DETECTED      INFLUENZA A NOT DETECTED    INFLUENZA B NOT DETECTED         RADIOLOGY  XR CHEST PORTABLE   Final Result   Small bilateral pleural effusions, with suspected overlying atelectasis. Evolving pneumonia is a differential possibility.          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 XR CHEST (2 VW)   Final Result   Trace bilateral pleural effusions. Discharge Medications     Medication List      CHANGE how you take these medications    metoprolol tartrate 25 MG tablet  Commonly known as: LOPRESSOR  Take 1 tablet by mouth 2 times daily  What changed:   · medication strength  · how much to take        CONTINUE taking these medications    aspirin 81 MG chewable tablet     calcium carbonate 500 MG Tabs tablet  Commonly known as: OSCAL     furosemide 40 MG tablet  Commonly known as: LASIX     hydroxychloroquine 200 MG tablet  Commonly known as: PLAQUENIL     potassium chloride 20 MEQ packet  Commonly known as: KLOR-CON        STOP taking these medications    meloxicam 15 MG tablet  Commonly known as: MOBIC           Where to Get Your Medications      Information about where to get these medications is not yet available    Ask your nurse or doctor about these medications  · metoprolol tartrate 25 MG tablet           Discharged in stable condition to SNF    Follow Up: Follow up with physician at SNF       Total time spent on discharge is 35 minutes    MONICA Smith.

## 2022-04-25 NOTE — PROGRESS NOTES
Patient asked who I was upon entering room. I told her I was Rossy Campbell, her nurse. I reminder her I was in the room to help set her up for breakfast. Patient then asked again who I was and who her nurse was. Patient seems mildly confused and is also agitated and verbally aggressive. Patient requesting nasal spray, administered per request. Patient states she is too weak to do this herself.

## 2022-04-25 NOTE — PROGRESS NOTES
PULMONARY PROGRESS NOTE  Hospital Day: 4   CC: Hypoxemia, dyspnea & elevated D-dimer    Subjective:   Improved. Stable on 2 L O2.     IV line: Peripheral    PHYSICAL EXAM:   /79   Pulse 99   Temp 98 °F (36.7 °C) (Oral)   Resp 16   Ht 5' 3\" (1.6 m)   Wt 151 lb 4 oz (68.6 kg)   SpO2 92%   BMI 26.79 kg/m² ' on 2 L  Constitutional:  No acute distress. More pleasant today. HENT:  Oropharynx is clear and moist.   Neck: No tracheal deviation present. Cardiovascular: Normal heart sounds. No lower extremity edema. Pulmonary/Chest: No wheezes. No rhonchi. No rales. No decreased breath sounds. No accessory muscle usage or stridor. Musculoskeletal: No cyanosis. No clubbing. Skin: Skin is warm and dry. Psychiatric: Normal mood and affect.   Neurologic: speech fluent, alert and oriented, strength symmetric     I independently performed the physical exam  Claudio Chapman MD on 4/25/22, 11:59 AM EDT     Scheduled Meds:   metoprolol tartrate  25 mg Oral BID    betamethasone dipropionate   Topical BID    furosemide  40 mg Oral Daily    potassium chloride  20 mEq Oral Once    hydroxychloroquine  200 mg Oral Daily    aspirin  81 mg Oral Daily    sodium chloride flush  5-40 mL IntraVENous 2 times per day    heparin (porcine)  5,000 Units SubCUTAneous TID     Continuous Infusions:   sodium chloride       PRN Meds:  albuterol sulfate HFA, sodium chloride, sodium chloride flush, sodium chloride, ondansetron **OR** ondansetron, acetaminophen **OR** acetaminophen, perflutren lipid microspheres    Labs:  CBC:   Recent Labs     04/22/22 2213 04/24/22  0431 04/25/22  0520   WBC 8.5 6.0 5.5   HGB 12.2 12.6 11.1*   HCT 36.2 37.4 32.6*   MCV 88.3 88.7 88.6    154 142     BMP:   Recent Labs     04/22/22 2213 04/23/22  0436 04/23/22  0436 04/24/22  0825 04/24/22  0826 04/25/22  0519 04/25/22  0520   NA   < > 140   < > 138 139 139 141   K   < > 4.2   < > 4.1 4.1 3.7 3.8   CL   < > 98*   < > 99 101 100 101   CO2 < > 28   < > 31 29 29 29   PHOS  --  4.5  --  3.3  --  2.4*  --    BUN   < > 38*   < > 27* 27* 22* 23*   CREATININE   < > 1.6*   < > 1.0 1.0 0.9 0.9    < > = values in this interval not displayed. Micro:   4/22/2022 SARS-CoV-2 & influenza not detected  BNP 9,235  (3,363 in Feb 2021)     Imaging:   CTPA 4/12/2021 @ [facility]  No significant mediastinal or hilar lymphadenopathy. There is a small irregular parenchymal density seen superior segment left lower lobe measuring approximately 6 x 13 mm. This could represent some focal consolidation or possibility of a mass lesion difficult to exclude. Clinical correlation and short interval follow-up CT is recommended in 3-6 months time versus possible PET scan   Patchy bibasilar airspace disease also noted. Centrilobular emphysema present. Patchy airspace disease also noted posteriorly right upper lobe. 1. No evidence of pulmonary emboli. .   2. Question focal consolidation versus a pulmonary mass superior segment left lower lobe. Short interval follow-up CT recommended versus possible PET scan. 3. Trace pleural effusions. 4. Bilateral areas of airspace disease as described above. 5. Questionable retrocrural lymphadenopathy versus prominent venous structure. 6. Marked compression deformity of T11 with milder compression deformity T12     CXR 4/22/2022  Trace bilateral pleural effusions. VQ Scan 4/23/22  Scattered heterogeneous perfusion is seen, with small defects at the lung   bases, that appear to be matched to the pleuroparenchymal opacity at the lung   bases on recent chest x-ray. 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 Evolving pneumonia is a differential possibility.     ASSESSMENT:  · Acute hypoxemic respiratory failure, baseline RA  · Elevated d-dimer 5,194 (438 in Apr 2021)   · JENS   · Fall with left hip pain  · pAFIB, not on [facility] - currently in SR  · COPD/pulmonary emphysema - without exacerbation  · Pulmonary consolidation - 0.6 x 1.3 cm superior MARY - on outside imaging. I previously d/w patient. She is clear -- she does not wish to pursue any additional studies or imaging. She knows this could be cancer, but wishes to do \"nothing I don't absolutely have to. \"  · Elevated SPAP 60 mmHg Echo 4/2017  · Dysphagia/esophageal dysmotility: with h/o abnormal manometry with EGJ outflow obstruction & features of type II achalasia. S/p EGD with botox 2/2021  · Never smoker per Epic    PLAN:  · Supplemental O2 to maintain SaO2 >92%; wean as tolerated    · Ortho has seen   · PT/OT  · OK for prophylactic dosing of heparin - triple matched defect on VQ makes PE a far less likely diagnosis  · D/C planning    I contributed to updating portions of this encounter note.    Pancho Johnson PA-C on 4/25/22 at 9:41 AM EDT

## 2022-04-25 NOTE — PROGRESS NOTES
7pm p/u time to go to [facility]. Rapid covid ordered and IV to be removed. Patient requesting chap stick, provided. Meatloaf ordered in place of pot roast d/t toughness.

## 2022-04-25 NOTE — DISCHARGE INSTR - COC
Continuity of Care Form    Patient Name: Anat Aguilar   :  1930  MRN:  [id number]    Admit date:  2022  Discharge date:  2022    Code Status Order: DNR-CCA   Advance Directives:      Admitting Physician:  Amador Chiu MD  PCP: Tracie Whittington    Discharging Nurse: [facility] Unit/Room#: /9910-93  Discharging Unit Phone Number: [phone]    Emergency Contact:   Extended Emergency Contact Information  Primary Emergency Contact: [address] Phone: [phone]  Mobile Phone: [phone]  Relation: Child  Secondary Emergency Contact: 2022 Phone: [phone]  Mobile Phone: [phone]  Relation: Grandchild    Past Surgical History:  History reviewed. No pertinent surgical history. Immunization History: There is no immunization history on file for this patient. Active Problems:  Patient Active Problem List   Diagnosis Code    JENS (acute kidney injury) ([facility] [address] N17.9    Atrial fibrillation (HCC) I48.91    COPD (chronic obstructive pulmonary disease) ([facility] [address] J44.9    Hypertension I10    Arthritis M19.90    Hypoxia R09.02    Acute respiratory failure with hypoxia ([facility] [address] J96.01    Fall W19. XXXA    Contusion of left hip S70. 02XA    Elevated d-dimer R79.89       Isolation/Infection:   Isolation            No Isolation          Patient Infection Status       Infection Onset Added Last Indicated Last Indicated By Review Planned Expiration Resolved Resolved By    None active    Resolved    COVID-19 (Rule Out) 22 COVID-19 & Influenza Combo (Ordered)   22 Pascual Brand RN    COVID-19 (Rule Out) 22 COVID-19 & Influenza Combo (Ordered)   22 Rule-Out Test Resulted            Nurse Assessment:  Last Vital Signs: /79   Pulse 99   Temp 98 °F (36.7 °C) (Oral)   Resp 16   Ht 5' 3\" (1.6 m)   Wt 151 lb 4 oz (68.6 kg)   SpO2 92%   BMI 26.79 kg/m²     Last documented pain score (0-10 scale): Pain Level: 2  Last Weight:   Wt Readings from Last 1 Encounters:   04/25/22 151 lb 4 oz (68.6 kg)     Mental Status:  alert and occasional disorientation, usually overnight (sun downing)    IV Access:  - None    Nursing Mobility/ADLs:  Walking   Assisted  Transfer  Assisted  Bathing  Assisted  Dressing  Assisted  Toileting  Assisted  Feeding  Independent after set up  [address] Delivery   whole    Wound Care Documentation and Therapy:        Elimination:  Continence: Bowel: Yes  Bladder: No  Urinary Catheter: None - external female catheter  Colostomy/Ileostomy/Ileal Conduit: No       Date of Last BM: --    Intake/Output Summary (Last 24 hours) at 4/25/2022 1257  Last data filed at 4/25/2022 0849  Gross per 24 hour   Intake 180 ml   Output 1650 ml   Net -1470 ml     I/O last 3 completed shifts:  In: -   Out: 9229 [Urine:1650]    Safety Concerns:     Sundowners [place], History of Falls (last 30 days), and At Risk for Falls    Impairments/Disabilities:      Vision and Hearing    Nutrition Therapy:  Current Nutrition Therapy:   - Oral Diet:  General    Routes of Feeding: Oral  Liquids: No Restrictions  Daily Fluid Restriction: no  Last Modified Barium Swallow with Video (Video Swallowing Test): not done    Treatments at the Time of Hospital Discharge:   Respiratory Treatments: inhalers  Oxygen Therapy:  is on oxygen at 1-2 L/min per nasal cannula. Ventilator:    - No ventilator support    Rehab Therapies: Physical Therapy and Occupational Therapy  Weight Bearing Status/Restrictions: No weight bearing restrictions  Other Medical Equipment (for information only, NOT a DME order):  wheelchair, cane, crutches, walker, bath bench, bedside commode, and hospital bedq  Other Treatments: --    Patient's personal belongings (please select all that are sent with patient):  Patient leaving facility with all personal belongings.      RN SIGNATURE:  Electronically signed by Jd Donaldson RN on 4/25/22 at 2:05 PM EDT    CASE MANAGEMENT/SOCIAL WORK SECTION    Inpatient Status Date: 4/23/2022    Readmission Risk Assessment Score:  Readmission Risk              Risk of Unplanned Readmission:  14           Discharging to Facility/ Agency   Name: Name: [facility]  Address: [place] [address]  Phone: [phone]  Fax: [phone]   Address:  Phone:  Fax:    Dialysis Facility (if applicable)   Name:  Address:  Dialysis Schedule:  Phone:  Fax:    / signature: Electronically signed by Princess Ruiz RN on 4/25/22 at 4:35 PM EDT    PHYSICIAN SECTION    Prognosis: Good    Condition at Discharge: Stable    Rehab Potential (if transferring to Rehab): Good    Recommended Labs or Other Treatments After Discharge: none. Start Anoro 1 puff daily    Physician Certification: I certify the above information and transfer of Harshad Goldman  is necessary for the continuing treatment of the diagnosis listed and that she requires [place] for less 30 days.      Update Admission H&P: No change in H&P    PHYSICIAN SIGNATURE:  Electronically signed by Sumit Esquivel MD on 4/25/22 at 12:58 PM EDT

## 2022-04-25 NOTE — PROGRESS NOTES
Patient yelling out this morning for help. Entered room and patient yelling at RN to get her set up for breakfast. Patient sat up for breakfast and grand daughter updated and transferred to patient room to speak with patient. Patient educated on how to appropriately use call light. Patient comes from assisted living but has very limited mobility. Needed help sitting up, opening various breakfast items, buttering toast, and stirring coffee. Patient assisted with these things, including picking up phone. Patient stated on phone to grand daughter \"I don't even know if I can walk. \"    PT/OT consults placed due to needs possibly being greater than what assisted living can provide. PT/OT aware.

## 2022-05-01 ENCOUNTER — APPOINTMENT (OUTPATIENT)
Dept: GENERAL RADIOLOGY | Age: 87
End: 2022-05-01
Payer: MEDICARE

## 2022-05-01 ENCOUNTER — HOSPITAL ENCOUNTER (EMERGENCY)
Age: 87
Discharge: HOME | End: 2022-05-01
Attending: STUDENT IN AN ORGANIZED HEALTH CARE EDUCATION/TRAINING PROGRAM
Payer: MEDICARE

## 2022-05-01 VITALS
DIASTOLIC BLOOD PRESSURE: 76 MMHG | HEART RATE: 66 BPM | SYSTOLIC BLOOD PRESSURE: 139 MMHG | TEMPERATURE: 98.5 F | HEIGHT: 63 IN | OXYGEN SATURATION: 90 % | RESPIRATION RATE: 14 BRPM | BODY MASS INDEX: 26.75 KG/M2 | WEIGHT: 151 LBS

## 2022-05-01 DIAGNOSIS — R45.1 AGITATION: Primary | ICD-10-CM

## 2022-05-01 DIAGNOSIS — N30.00 ACUTE CYSTITIS WITHOUT HEMATURIA: ICD-10-CM

## 2022-05-01 LAB
A/G RATIO: 1.3 (ref 1.1–2.2)
ALBUMIN SERPL-MCNC: 3.8 G/DL (ref 3.4–5)
ALP BLD-CCNC: 81 U/L (ref 40–129)
ALT SERPL-CCNC: 7 U/L (ref 10–40)
ANION GAP SERPL CALCULATED.3IONS-SCNC: 9 MMOL/L (ref 3–16)
AST SERPL-CCNC: 18 U/L (ref 15–37)
BASE EXCESS VENOUS: 5.5 MMOL/L (ref -3–3)
BASOPHILS ABSOLUTE: 0 K/UL (ref 0–0.2)
BASOPHILS RELATIVE PERCENT: 0.6 %
BILIRUB SERPL-MCNC: 0.6 MG/DL (ref 0–1)
BILIRUBIN URINE: NEGATIVE
BLOOD, URINE: NEGATIVE
BUN BLDV-MCNC: 31 MG/DL (ref 7–20)
CALCIUM SERPL-MCNC: 9.4 MG/DL (ref 8.3–10.6)
CARBOXYHEMOGLOBIN: 3 % (ref 0–1.5)
CHLORIDE BLD-SCNC: 101 MMOL/L (ref 99–110)
CLARITY: CLEAR
CO2: 30 MMOL/L (ref 21–32)
COLOR: YELLOW
CREAT SERPL-MCNC: 1 MG/DL (ref 0.6–1.2)
EOSINOPHILS ABSOLUTE: 0.1 K/UL (ref 0–0.6)
EOSINOPHILS RELATIVE PERCENT: 2.2 %
EPITHELIAL CELLS, UA: ABNORMAL /HPF (ref 0–5)
GFR AFRICAN AMERICAN: >60
GFR NON-AFRICAN AMERICAN: 52
GLUCOSE BLD-MCNC: 93 MG/DL (ref 70–99)
GLUCOSE URINE: NEGATIVE MG/DL
HCO3 VENOUS: 30.9 MMOL/L (ref 23–29)
HCT VFR BLD CALC: 36.3 % (ref 36–48)
HEMOGLOBIN: 12 G/DL (ref 12–16)
HYALINE CASTS: ABNORMAL /LPF (ref 0–2)
KETONES, URINE: NEGATIVE MG/DL
LEUKOCYTE ESTERASE, URINE: NEGATIVE
LYMPHOCYTES ABSOLUTE: 0.7 K/UL (ref 1–5.1)
LYMPHOCYTES RELATIVE PERCENT: 16 %
MCH RBC QN AUTO: 29.8 PG (ref 26–34)
MCHC RBC AUTO-ENTMCNC: 33.1 G/DL (ref 31–36)
MCV RBC AUTO: 90 FL (ref 80–100)
METHEMOGLOBIN VENOUS: 0.3 %
MICROSCOPIC EXAMINATION: ABNORMAL
MONOCYTES ABSOLUTE: 0.8 K/UL (ref 0–1.3)
MONOCYTES RELATIVE PERCENT: 19.3 %
MUCUS: ABNORMAL /LPF
NEUTROPHILS ABSOLUTE: 2.7 K/UL (ref 1.7–7.7)
NEUTROPHILS RELATIVE PERCENT: 61.9 %
NITRITE, URINE: NEGATIVE
O2 SAT, VEN: 91 %
O2 THERAPY: ABNORMAL
PCO2, VEN: 48.5 MMHG (ref 40–50)
PDW BLD-RTO: 14.5 % (ref 12.4–15.4)
PH UA: 7 (ref 5–8)
PH VENOUS: 7.42 (ref 7.35–7.45)
PLATELET # BLD: 196 K/UL (ref 135–450)
PMV BLD AUTO: 6.3 FL (ref 5–10.5)
PO2, VEN: 59.2 MMHG (ref 25–40)
POTASSIUM REFLEX MAGNESIUM: 4 MMOL/L (ref 3.5–5.1)
PROTEIN UA: NEGATIVE MG/DL
RBC # BLD: 4.03 M/UL (ref 4–5.2)
RBC UA: ABNORMAL /HPF (ref 0–4)
SODIUM BLD-SCNC: 140 MMOL/L (ref 136–145)
SPECIFIC GRAVITY UA: 1.02 (ref 1–1.03)
TCO2 CALC VENOUS: 32 MMOL/L
TOTAL PROTEIN: 6.7 G/DL (ref 6.4–8.2)
URINE TYPE: ABNORMAL
UROBILINOGEN, URINE: 2 E.U./DL
WBC # BLD: 4.3 K/UL (ref 4–11)
WBC UA: ABNORMAL /HPF (ref 0–5)

## 2022-05-01 PROCEDURE — 6370000000 HC RX 637 (ALT 250 FOR IP): Performed by: STUDENT IN AN ORGANIZED HEALTH CARE EDUCATION/TRAINING PROGRAM

## 2022-05-01 PROCEDURE — 85025 COMPLETE CBC W/AUTO DIFF WBC: CPT

## 2022-05-01 PROCEDURE — 71045 X-RAY EXAM CHEST 1 VIEW: CPT

## 2022-05-01 PROCEDURE — 82803 BLOOD GASES ANY COMBINATION: CPT

## 2022-05-01 PROCEDURE — 99284 EMERGENCY DEPT VISIT MOD MDM: CPT

## 2022-05-01 PROCEDURE — 87086 URINE CULTURE/COLONY COUNT: CPT

## 2022-05-01 PROCEDURE — 80053 COMPREHEN METABOLIC PANEL: CPT

## 2022-05-01 PROCEDURE — 81001 URINALYSIS AUTO W/SCOPE: CPT

## 2022-05-01 RX ORDER — BUSPIRONE HYDROCHLORIDE 5 MG/1
5 TABLET ORAL 2 TIMES DAILY
COMMUNITY

## 2022-05-01 RX ORDER — CEPHALEXIN 500 MG/1
500 CAPSULE ORAL ONCE
Status: COMPLETED | OUTPATIENT
Start: 2022-05-01 | End: 2022-05-01

## 2022-05-01 RX ORDER — QUETIAPINE FUMARATE 25 MG/1
25 TABLET, FILM COATED ORAL NIGHTLY PRN
COMMUNITY

## 2022-05-01 RX ORDER — CEPHALEXIN 500 MG/1
500 CAPSULE ORAL 2 TIMES DAILY
Qty: 14 CAPSULE | Refills: 0 | Status: SHIPPED | OUTPATIENT
Start: 2022-05-01 | End: 2022-05-08

## 2022-05-01 RX ADMIN — CEPHALEXIN 500 MG: 500 CAPSULE ORAL at 11:58

## 2022-05-01 ASSESSMENT — ENCOUNTER SYMPTOMS
SHORTNESS OF BREATH: 0
SORE THROAT: 0
ABDOMINAL PAIN: 0
VOMITING: 0
BACK PAIN: 0
RHINORRHEA: 0
NAUSEA: 0
COUGH: 0

## 2022-05-01 ASSESSMENT — PAIN - FUNCTIONAL ASSESSMENT: PAIN_FUNCTIONAL_ASSESSMENT: NONE - DENIES PAIN

## 2022-05-01 NOTE — ED NOTES
1110- Set up patient transport with [facility] for transport back to nursing home [facility] is one hour     Sreedhar Montes  05/01/22 [address]  05/01/22 1119

## 2022-05-03 LAB — URINE CULTURE, ROUTINE: NORMAL

## 2022-05-03 NOTE — PROGRESS NOTES
Physician Progress Note      Maxim Flores  [facility] #:                  [id number]  :                       1930  ADMIT DATE:       2022 7:36 PM  [address] [place] DATE:        2022 7:47 PM  RESPONDING  PROVIDER #:        Amy Foley MD          QUERY TEXT:    Patient admitted with left hip pain after fall and hypoxia. Noted   documentation of acute respiratory failure which may be challenged on a   clinical basis by an external reviewer. Patient with noted hypoxia and   saturation of 86-89%. No documentation of acute respiratory distress or   increased work of breathing. In order to support the diagnosis of acute   respiratory failure, please include additional clinical indicators in your   documentation. Or please document if the diagnosis of acute respiratory   failure has been ruled out after further study.     The medical record reflects the following:  Risk Factors: advanced age, emphysema/copd  Clinical Indicators: per ED: breathing on room air satting 86-89%, complaining   of mild shortness of breath that has been chronic for her, she has no   tachypnea no conversational dyspnea, no accessory muscle use no other sign of   acute respiratory distress  Treatment: oxygen 2 L, pulmonary consult, Inhaled bronchodilators (Anoro at   home per PCP note)    Acute Respiratory Failure Clinical Indicators per  MS-DRG Training Guide and   Quick Reference Guide:  pO2 < 60 mmHg or SpO2 (pulse oximetry) < 91% breathing room air  pCO2 > 50 and pH < 7.35  P/F ratio (pO2 / FIO2) < 300  pO2 decrease or pCO2 increase by 10 mmHg from baseline (if known)  Supplemental oxygen of 40% or more  Presence of respiratory distress, tachypnea, dyspnea, shortness of breath,   wheezing  Unable to speak in complete sentences  Use of accessory muscles to breathe  Extreme anxiety and feeling of impending doom  Tripod position  Confusion/altered mental status/obtunded    Thank you for your assistance,  Annamarie Gilliam RN,BSN,CCDS,CRCR  Options provided:  -- Acute Respiratory Failure as evidenced by, Please document evidence. -- Acute Respiratory Failure ruled out after study  -- Other - I will add my own diagnosis  -- Disagree - Not applicable / Not valid  -- Disagree - Clinically unable to determine / Unknown  -- Refer to Clinical Documentation Reviewer    PROVIDER RESPONSE TEXT:    Acute Respiratory Failure has been ruled out after study.     Query created by: Eladia Alanis on 4/29/2022 10:20 AM      Electronically signed by:  Mariajose Yu MD 5/3/2022 5:21 PM
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
## 2022-06-25 ENCOUNTER — APPOINTMENT (OUTPATIENT)
Dept: CT IMAGING | Age: 87
End: 2022-06-25
Payer: MEDICARE

## 2022-06-25 ENCOUNTER — HOSPITAL ENCOUNTER (EMERGENCY)
Age: 87
Discharge: HOME | End: 2022-06-25
Attending: EMERGENCY MEDICINE
Payer: MEDICARE

## 2022-06-25 ENCOUNTER — APPOINTMENT (OUTPATIENT)
Dept: GENERAL RADIOLOGY | Age: 87
End: 2022-06-25
Payer: MEDICARE

## 2022-06-25 VITALS
HEIGHT: 63 IN | TEMPERATURE: 97.8 F | SYSTOLIC BLOOD PRESSURE: 105 MMHG | HEART RATE: 64 BPM | OXYGEN SATURATION: 92 % | BODY MASS INDEX: 26.58 KG/M2 | RESPIRATION RATE: 16 BRPM | DIASTOLIC BLOOD PRESSURE: 65 MMHG | WEIGHT: 150 LBS

## 2022-06-25 DIAGNOSIS — R07.9 CHEST PAIN, UNSPECIFIED TYPE: ICD-10-CM

## 2022-06-25 DIAGNOSIS — J18.9 PNEUMONIA OF RIGHT LUNG DUE TO INFECTIOUS ORGANISM, UNSPECIFIED PART OF LUNG: Primary | ICD-10-CM

## 2022-06-25 LAB
A/G RATIO: 1.5 (ref 1.1–2.2)
ALBUMIN SERPL-MCNC: 4.3 G/DL (ref 3.4–5)
ALP BLD-CCNC: 106 U/L (ref 40–129)
ALT SERPL-CCNC: 6 U/L (ref 10–40)
ANION GAP SERPL CALCULATED.3IONS-SCNC: 8 MMOL/L (ref 3–16)
AST SERPL-CCNC: 17 U/L (ref 15–37)
BASOPHILS ABSOLUTE: 0 K/UL (ref 0–0.2)
BASOPHILS RELATIVE PERCENT: 0.4 %
BILIRUB SERPL-MCNC: 0.3 MG/DL (ref 0–1)
BUN BLDV-MCNC: 23 MG/DL (ref 7–20)
CALCIUM SERPL-MCNC: 9.7 MG/DL (ref 8.3–10.6)
CHLORIDE BLD-SCNC: 99 MMOL/L (ref 99–110)
CO2: 31 MMOL/L (ref 21–32)
CREAT SERPL-MCNC: 1.2 MG/DL (ref 0.6–1.2)
D DIMER: 2.53 UG/ML FEU (ref 0–0.6)
EKG ATRIAL RATE: 63 BPM
EKG DIAGNOSIS: NORMAL
EKG P AXIS: 72 DEGREES
EKG P-R INTERVAL: 202 MS
EKG Q-T INTERVAL: 450 MS
EKG QRS DURATION: 92 MS
EKG QTC CALCULATION (BAZETT): 460 MS
EKG R AXIS: 59 DEGREES
EKG T AXIS: 42 DEGREES
EKG VENTRICULAR RATE: 63 BPM
EOSINOPHILS ABSOLUTE: 0.2 K/UL (ref 0–0.6)
EOSINOPHILS RELATIVE PERCENT: 2.7 %
GFR AFRICAN AMERICAN: 51
GFR NON-AFRICAN AMERICAN: 42
GLUCOSE BLD-MCNC: 102 MG/DL (ref 70–99)
HCT VFR BLD CALC: 41.1 % (ref 36–48)
HEMOGLOBIN: 13.4 G/DL (ref 12–16)
LYMPHOCYTES ABSOLUTE: 0.9 K/UL (ref 1–5.1)
LYMPHOCYTES RELATIVE PERCENT: 15.8 %
MCH RBC QN AUTO: 29.8 PG (ref 26–34)
MCHC RBC AUTO-ENTMCNC: 32.5 G/DL (ref 31–36)
MCV RBC AUTO: 91.8 FL (ref 80–100)
MONOCYTES ABSOLUTE: 0.7 K/UL (ref 0–1.3)
MONOCYTES RELATIVE PERCENT: 12.1 %
NEUTROPHILS ABSOLUTE: 4.2 K/UL (ref 1.7–7.7)
NEUTROPHILS RELATIVE PERCENT: 69 %
PDW BLD-RTO: 14.5 % (ref 12.4–15.4)
PLATELET # BLD: 197 K/UL (ref 135–450)
PMV BLD AUTO: 6.6 FL (ref 5–10.5)
POTASSIUM REFLEX MAGNESIUM: 5.2 MMOL/L (ref 3.5–5.1)
PRO-BNP: 4661 PG/ML (ref 0–449)
RBC # BLD: 4.48 M/UL (ref 4–5.2)
SODIUM BLD-SCNC: 138 MMOL/L (ref 136–145)
TOTAL PROTEIN: 7.2 G/DL (ref 6.4–8.2)
TROPONIN: <0.01 NG/ML
WBC # BLD: 6 K/UL (ref 4–11)

## 2022-06-25 PROCEDURE — 6360000004 HC RX CONTRAST MEDICATION: Performed by: EMERGENCY MEDICINE

## 2022-06-25 PROCEDURE — 85025 COMPLETE CBC W/AUTO DIFF WBC: CPT

## 2022-06-25 PROCEDURE — 71046 X-RAY EXAM CHEST 2 VIEWS: CPT

## 2022-06-25 PROCEDURE — 80053 COMPREHEN METABOLIC PANEL: CPT

## 2022-06-25 PROCEDURE — 99285 EMERGENCY DEPT VISIT HI MDM: CPT

## 2022-06-25 PROCEDURE — 84484 ASSAY OF TROPONIN QUANT: CPT

## 2022-06-25 PROCEDURE — 6370000000 HC RX 637 (ALT 250 FOR IP): Performed by: EMERGENCY MEDICINE

## 2022-06-25 PROCEDURE — 71260 CT THORAX DX C+: CPT

## 2022-06-25 PROCEDURE — 85379 FIBRIN DEGRADATION QUANT: CPT

## 2022-06-25 PROCEDURE — 93005 ELECTROCARDIOGRAM TRACING: CPT | Performed by: EMERGENCY MEDICINE

## 2022-06-25 PROCEDURE — 83880 ASSAY OF NATRIURETIC PEPTIDE: CPT

## 2022-06-25 PROCEDURE — 93010 ELECTROCARDIOGRAM REPORT: CPT | Performed by: INTERNAL MEDICINE

## 2022-06-25 RX ORDER — LEVOFLOXACIN 750 MG/1
750 TABLET ORAL DAILY
Qty: 5 TABLET | Refills: 0 | Status: SHIPPED | OUTPATIENT
Start: 2022-06-25 | End: 2022-06-30

## 2022-06-25 RX ORDER — AMOXICILLIN AND CLAVULANATE POTASSIUM 875; 125 MG/1; MG/1
1 TABLET, FILM COATED ORAL ONCE
Status: DISCONTINUED | OUTPATIENT
Start: 2022-06-25 | End: 2022-06-25

## 2022-06-25 RX ORDER — LEVOFLOXACIN 750 MG/1
750 TABLET ORAL ONCE
Status: COMPLETED | OUTPATIENT
Start: 2022-06-25 | End: 2022-06-25

## 2022-06-25 RX ADMIN — IOPAMIDOL 75 ML: 755 INJECTION, SOLUTION INTRAVENOUS at 03:22

## 2022-06-25 RX ADMIN — LEVOFLOXACIN 750 MG: 750 TABLET, FILM COATED ORAL at 05:09

## 2022-06-25 ASSESSMENT — PAIN - FUNCTIONAL ASSESSMENT
PAIN_FUNCTIONAL_ASSESSMENT: NONE - DENIES PAIN
PAIN_FUNCTIONAL_ASSESSMENT: NONE - DENIES PAIN

## 2022-06-25 NOTE — ED PROVIDER NOTES
[facility]  ED PROVIDER NOTE    Patient Identification  Pt Name: Desiree Bence  MRN: [id number]  [place]orge 12/12/1930  Date of evaluation: 6/25/2022  Provider: Jed Randolph MD  PCP: Drea Braden    Chief Complaint  Cough (from [facility] and being activly treated for URI. Per SNF pt \"walking to BR and stopped breathing\". Pt O2 96%, pt awake, alert and oriented at time episode.  0 s/s of distress at time of arrival )      HPI  History provided by patient   This is a 80 y.o. female who presents to the ED for cough. Ongoing for the past 1-1/2 weeks. She was started on azithromycin yesterday. Today, she was walking to the bathroom and apparently stopped breathing. During this time, she was having chest discomfort and shortness of breath. No nausea or vomiting. No fevers. May have sick contacts. She is currently asymptomatic. ROS  12 systems reviewed, pertinent positives/negatives per HPI otherwise noted to be negative. I have reviewed the following nursing documentation:  Allergies: Sulfa antibiotics    Past medical history:   Past Medical History:   Diagnosis Date    Arthritis     Atrial fibrillation ([facility] [address]     COPD (chronic obstructive pulmonary disease) ([facility] [address] [facility]     Hypertension      Past surgical history: History reviewed. No pertinent surgical history.     Home medications:   Previous Medications    ASPIRIN 81 MG CHEWABLE TABLET    Take 81 mg by mouth daily    BUSPIRONE (BUSPAR) 5 MG TABLET    Take 5 mg by mouth 2 times daily    CALCIUM CARBONATE (OSCAL) 500 MG TABS TABLET    Take 500 mg by mouth daily    FUROSEMIDE (LASIX) 40 MG TABLET    Take 40 mg by mouth daily    HYDROXYCHLOROQUINE (PLAQUENIL) 200 MG TABLET    Take by mouth daily    METOPROLOL TARTRATE (LOPRESSOR) 25 MG TABLET    Take 1 tablet by mouth 2 times daily    POTASSIUM CHLORIDE (KLOR-CON) 20 MEQ PACKET    Take 20 mEq by mouth 2 times daily    QUETIAPINE (SEROQUEL) 25 MG TABLET    Take 25 mg by mouth nightly as needed       Social history:  reports that she has never smoked. She does not have any smokeless tobacco history on file. She reports that she does not drink alcohol and does not use drugs. Family history:  History reviewed. No pertinent family history. Exam  ED Triage Vitals [06/25/22 0010]   BP Temp Temp Source Heart Rate Resp SpO2 Height Weight   (!) 185/84 (!) 96.5 °F (35.8 °C) Oral 61 18 96 % -- 151 lb (68.5 kg)     Nursing note and vitals reviewed. Constitutional: In no acute distress  HENT:      Head: Normocephalic      Ears: External ears normal.      Nose: Nose normal.     Mouth: Membrane mucosa moist   Eyes: No discharge. Neck: Supple. Trachea midline. Cardiovascular: Regular rate. Warm extremities  Pulmonary/Chest: Effort normal. No respiratory distress. Clear to auscultation bilaterally  Abdominal: Soft. No distension. Nontender  : Deferred  Rectal: Deferred   Musculoskeletal: Moves all extremities. No gross deformity. Neurological: Alert and oriented. Face symmetric. Speech is clear. Skin: Warm and dry. Psychiatric: Normal mood and affect. Behavior is normal.    Procedures      EKG  The Ekg interpreted by me in the absence of a cardiologist shows. Normal sinus rhythm. No specific ST changes appreciated. HR 63  No significant change from prior EKG dated 4/22        Radiology  CT CHEST PULMONARY EMBOLISM W CONTRAST   Final Result   Pulmonary evaluation limited due to contrast bolus timing. No evidence of   central or lobar pulmonary artery filling defects. Evaluation beyond this   level nondiagnostic. Normal caliber main pulmonary artery. Nodular consolidation within the lateral aspect right middle lobe may be   infectious or inflammatory. However, given the nodular morphology, recommend   follow-up chest CT within 3 months. Moderate size hiatal hernia. XR CHEST (2 VW)   Final Result   No evidence of acute process. Probable left basilar atelectasis. Labs  Results for orders placed or performed during the hospital encounter of 06/25/22   CBC with Auto Differential   Result Value Ref Range    WBC 6.0 4.0 - 11.0 K/uL    RBC 4.48 4.00 - 5.20 M/uL    Hemoglobin 13.4 12.0 - 16.0 g/dL    Hematocrit 41.1 36.0 - 48.0 %    MCV 91.8 80.0 - 100.0 fL    MCH 29.8 26.0 - 34.0 pg    MCHC 32.5 31.0 - 36.0 g/dL    RDW 14.5 12.4 - 15.4 %    Platelets 549 716 - 605 K/uL    MPV 6.6 5.0 - 10.5 fL    Neutrophils % 69.0 %    Lymphocytes % 15.8 %    Monocytes % 12.1 %    Eosinophils % 2.7 %    Basophils % 0.4 %    Neutrophils Absolute 4.2 1.7 - 7.7 K/uL    Lymphocytes Absolute 0.9 (L) 1.0 - 5.1 K/uL    Monocytes Absolute 0.7 0.0 - 1.3 K/uL    Eosinophils Absolute 0.2 0.0 - 0.6 K/uL    Basophils Absolute 0.0 0.0 - 0.2 K/uL   Comprehensive Metabolic Panel w/ Reflex to MG   Result Value Ref Range    Sodium 138 136 - 145 mmol/L    Potassium reflex Magnesium 5.2 (H) 3.5 - 5.1 mmol/L    Chloride 99 99 - 110 mmol/L    CO2 31 21 - 32 mmol/L    Anion Gap 8 3 - 16    Glucose 102 (H) 70 - 99 mg/dL    BUN 23 (H) 7 - 20 mg/dL    CREATININE 1.2 0.6 - 1.2 mg/dL    GFR Non-African American 42 (A) >60    GFR  51 (A) >60    Calcium 9.7 8.3 - 10.6 mg/dL    Total Protein 7.2 6.4 - 8.2 g/dL    Albumin 4.3 3.4 - 5.0 g/dL    Albumin/Globulin Ratio 1.5 1.1 - 2.2    Total Bilirubin 0.3 0.0 - 1.0 mg/dL    Alkaline Phosphatase 106 40 - 129 U/L    ALT 6 (L) 10 - 40 U/L    AST 17 15 - 37 U/L   Troponin   Result Value Ref Range    Troponin <0.01 <0.01 ng/mL   Brain Natriuretic Peptide   Result Value Ref Range    Pro-BNP 4,661 (H) 0 - 449 pg/mL   D-Dimer, Quantitative   Result Value Ref Range    D-Dimer, Quant 2.53 (H) 0.00 - 0.60 ug/mL FEU   EKG 12 Lead   Result Value Ref Range    Ventricular Rate 63 BPM    Atrial Rate 63 BPM    P-R Interval 202 ms    QRS Duration 92 ms    Q-T Interval 450 ms    QTc Calculation (Bazett) 460 ms    P Axis 72 degrees    R Axis 59 degrees    T Axis 42 degrees Diagnosis       Normal sinus rhythmNormal ECGWhen compared with ECG of 22-APR-2022 22:08,No significant change was found       Screenings   Rut Coma Scale  Eye Opening: Spontaneous  Best Verbal Response: Oriented  Best Motor Response: Obeys commands  Rut Coma Scale Score: 15       MDM and ED Course  This is a 80 y.o. female who presents to the ED for cough for 1-1/2 weeks, just started on azithromycin. No believe that this is treatment failure. Will obtain x-ray. Had an episode of chest discomfort and shortness of breath lasting for less than 60 seconds. Doubt ACS. Will obtain EKG and troponin. Pulmonary embolism in differential.  No tachycardia, tachypnea, hypoxia, unilateral DVT symptoms. Will obtain D-dimer to help her stratify for this as well as for dissection. Dimer elevated. Found possible pneumonia on CT scan. Started patient on Levaquin. She declines admission for today. I did discuss with the patient that given her age, she would not be at low risk for poor outcome. I do believe that she has medical decision-making capacity. She does live in a nursing facility and has access to care and is able to return here if necessary. All questions answered and return precautions given    EKG showed no acute ischemic changes. Troponin negative. [unfilled]    Is this patient to be included in the SEP-1 Core Measure due to severe sepsis or septic shock? No   Exclusion criteria - the patient is NOT to be included for SEP-1 Core Measure due to:  2+ SIRS criteria are not met        Final Impression  1. Pneumonia of right lung due to infectious organism, unspecified part of lung    2. Chest pain, unspecified type        Blood pressure (!) 185/84, pulse 61, temperature (!) 96.5 °F (35.8 °C), temperature source Oral, resp. rate 18, weight 151 lb (68.5 kg), SpO2 96 %.      Disposition:  DISPOSITION Decision To Discharge 06/25/2022 04:34:29 AM      Patient Referrals:  [employer] Denia Jefferson  [facility]  [phone]    In 1 day        Discharge Medications:  New Prescriptions    LEVOFLOXACIN (LEVAQUIN) 750 MG TABLET    Take 1 tablet by mouth daily for 5 days       Discontinued Medications:  Discontinued Medications    No medications on file       This chart was generated using the [address] [place] [address] [address]  dictation system. I created this record but it may contain dictation errors given the limitations of this technology.        Lenora Donnelly MD  06/25/22 1699

## 2022-06-25 NOTE — ED NOTES
Pt is yelling at staff and angry there are not any rooms available for her to be placed in. Pt is in G5 where she has been triaged and given warm blankets and a pillow. Pt belligerent when speaking to this staff. Pt states \"I don't want to be here call 911 to take me back\". When educated that [facility] sent her here for evaluation she states \"those dumb assess don't know anything\".       Timothy Navarrete RN  06/25/22 3872

## 2022-06-25 NOTE — ED NOTES
Report received care assumed. New IV line inserted CT notified patient is ready.   Moved to room 6 VS as charted      Tono Bravo RN  06/25/22 4179

## 2022-06-25 NOTE — ED NOTES
Pt resting on cot in position of comfort. Respirations regular. Airway intact. GCS 15. Pt holding conversation with this RN w/o difficulty.  0 s/s of distress. Awaiting further orders. Will continue to monitor.        Romana Cornelia, RN  06/25/22 5193

## 2022-06-25 NOTE — ED NOTES
Returned from CT scan, Placed on [address].  Tolerating with no difficulty      Petey Herrera RN  06/25/22 7656

## 2022-06-25 NOTE — ED NOTES
Informed patient of ETA.  Provided crackers, jello, applesauce, and po fluids took pill with no difficulty     Davey Mayo RN  06/25/22 6279

## 2022-06-27 ENCOUNTER — TELEPHONE (OUTPATIENT)
Dept: PULMONOLOGY | Age: 87
End: 2022-06-27

## 2022-06-27 NOTE — TELEPHONE ENCOUNTER
npt ref by Anya Lai, COPD hypoxia   Spoke with Lou [facility]cal[facility] to schedule appt with pt. Gave her some possible dates. They will contact family and pt for date for scheduling. Pt saw Dr. Ector Garcia in hospital 4/22.

## 2022-06-29 ENCOUNTER — TELEPHONE (OUTPATIENT)
Dept: PULMONOLOGY | Age: 87
End: 2022-06-29

## 2022-06-29 ENCOUNTER — OFFICE VISIT (OUTPATIENT)
Dept: PULMONOLOGY | Age: 87
End: 2022-06-29
Payer: MEDICARE

## 2022-06-29 VITALS
SYSTOLIC BLOOD PRESSURE: 122 MMHG | BODY MASS INDEX: 26.58 KG/M2 | OXYGEN SATURATION: 94 % | HEIGHT: 63 IN | WEIGHT: 150 LBS | HEART RATE: 62 BPM | DIASTOLIC BLOOD PRESSURE: 68 MMHG

## 2022-06-29 DIAGNOSIS — I35.0 SEVERE AORTIC STENOSIS: ICD-10-CM

## 2022-06-29 DIAGNOSIS — J44.9 CHRONIC OBSTRUCTIVE PULMONARY DISEASE, UNSPECIFIED COPD TYPE (HCC): Primary | ICD-10-CM

## 2022-06-29 PROCEDURE — 1036F TOBACCO NON-USER: CPT | Performed by: INTERNAL MEDICINE

## 2022-06-29 PROCEDURE — 99214 OFFICE O/P EST MOD 30 MIN: CPT | Performed by: INTERNAL MEDICINE

## 2022-06-29 PROCEDURE — 1123F ACP DISCUSS/DSCN MKR DOCD: CPT | Performed by: INTERNAL MEDICINE

## 2022-06-29 PROCEDURE — 3023F SPIROM DOC REV: CPT | Performed by: INTERNAL MEDICINE

## 2022-06-29 PROCEDURE — 1090F PRES/ABSN URINE INCON ASSESS: CPT | Performed by: INTERNAL MEDICINE

## 2022-06-29 PROCEDURE — G8417 CALC BMI ABV UP PARAM F/U: HCPCS | Performed by: INTERNAL MEDICINE

## 2022-06-29 PROCEDURE — G8427 DOCREV CUR MEDS BY ELIG CLIN: HCPCS | Performed by: INTERNAL MEDICINE

## 2022-06-29 RX ORDER — FLUTICASONE FUROATE, UMECLIDINIUM BROMIDE AND VILANTEROL TRIFENATATE 100; 62.5; 25 UG/1; UG/1; UG/1
1 POWDER RESPIRATORY (INHALATION) DAILY
Qty: 1 EACH | Refills: 5 | Status: SHIPPED | OUTPATIENT
Start: 2022-06-29 | End: 2022-10-10

## 2022-06-29 RX ORDER — UMECLIDINIUM BROMIDE AND VILANTEROL TRIFENATATE 62.5; 25 UG/1; UG/1
POWDER RESPIRATORY (INHALATION)
COMMUNITY
Start: 2022-04-13 | End: 2022-06-29

## 2022-06-29 RX ORDER — ALBUTEROL SULFATE 90 UG/1
2 AEROSOL, METERED RESPIRATORY (INHALATION) EVERY 6 HOURS PRN
COMMUNITY
Start: 2022-01-21

## 2022-06-29 RX ORDER — ALBUTEROL SULFATE 1.25 MG/3ML
1 SOLUTION RESPIRATORY (INHALATION) EVERY 4 HOURS PRN
Qty: 360 ML | Refills: 3 | Status: SHIPPED | OUTPATIENT
Start: 2022-06-29

## 2022-06-29 NOTE — PROGRESS NOTES
Pulmonary Follow-up Note  Chief Complaint: shortness of breath, [facility]  Referring Provider: hospital f/u    Interval history: d/c'd 4/25/22 after treatment for COPD, pneumonia. Returned toED on 6/25/22 with cough and acute difficulty breathing. Had CTPA. Treated with levaquin. However, symptoms are more suggestive of cardiac issue. She has progressive dyspnea on exertion. Pulmonary issues seem mild. AS is severe on ECHO, I don't see a valve area. She is interested in treatments that will help her live better, not necessarily longer. She is interested in hearing if TAVR is recommended and if it is, what is involved. Presenting history: 4/23/22 Tatianna Arteaga is a 80 y.o. female with a PMHx of COPD, paroxysmal AFIB and LE edema on lasix, who previously received all her care at [address] [place] [address] facilities but recently moved to [place] for a higher leveled skilled nursing facility this year who presented to the ED on 4/22/2022 with moderate left hip pain after a fall in her room at her SNF after she lost her footing, worse with bearing weight; no associated numbness or head trauma. Additionally, she's had 1 week of worsening sob prompting a CXR the prior day that was normal.  Chronic RAFAELA at baseline. No baseline O2. Pt was requiring 2 L O2 in the ER and was unable to be weaned and so was admitted. ER was unable to obtain CTPA d/t elevated Cr so started therapeutic dose lovenox. Now states breathing feels fine. Reports she has never been on [facility] for pAFIB. reports that she has never smoked. She has never used smokeless tobacco.     Review of Systems:  dyspnea on exertion, RAFAELA, chest discomfort occasionally on exertion, with recent severe attack    Physical Exam:  Blood pressure 122/68, pulse 62, height 5' 3\" (1.6 m), weight 150 lb (68 kg), SpO2 94 %. Constitutional:  No acute distress. HENT:  Oropharynx is clear and moist.   Eyes: Pupils equal, round, and reactive to light. No scleral icterus.    Neck: No tracheal deviation present. Cardiovascular: Loud murmur of AS.  ++ lower extremity edema. Pulmonary/Chest: No wheezes. No rhonchi. No rales. No decreased breath sounds. No accessory muscle usage or stridor. Musculoskeletal: No cyanosis. No clubbing. Skin: Skin is warm and dry. Psychiatric: Normal mood and affect. Data:   Imaging:   CTPA 4/12/2021 @ [facility]  No significant mediastinal or hilar lymphadenopathy. There is a small irregular parenchymal density seen superior segment left lower lobe measuring approximately 6 x 13 mm. This could represent some focal consolidation or possibility of a mass lesion difficult to exclude. Clinical correlation and short interval follow-up CT is recommended in 3-6 months time versus possible PET scan Patchy bibasilar airspace disease also noted. Centrilobular emphysema present. Patchy airspace disease also noted posteriorly right upper lobe.    1. No evidence of pulmonary emboli. .   2. Question focal consolidation versus a pulmonary mass superior segment left lower lobe. Short interval follow-up CT recommended versus possible PET scan. 3. Trace pleural effusions. 4. Bilateral areas of airspace disease as described above. 5. Questionable retrocrural lymphadenopathy versus prominent venous structure. 6. Marked compression deformity of T11 with milder compression deformity T12      CTPA 6/25/22  Pulmonary Arteries: Pulmonary evaluation limited due to contrast bolus   timing.  No evidence of central or lobar pulmonary artery filling defects.    Evaluation beyond this level nondiagnostic.  Normal caliber main pulmonary   artery.       Mediastinum: Cardiomegaly.  No pericardial effusion.  Normal caliber thoracic   aorta.  No mediastinal, hilar, or axillary lymphadenopathy.       Lungs/pleura: Nodular consolidation within the lateral aspect right middle   lobe may be infectious or inflammatory.  Bibasilar atelectasis.  No effusion   or pneumothorax.     Upper Abdomen: Moderate size hiatal hernia.       Soft Tissues/Bones: Degenerative changes of the spine with chronic appearing   compression deformity involving T11 and T12.           Impression   Pulmonary evaluation limited due to contrast bolus timing. No evidence of   central or lobar pulmonary artery filling defects. Evaluation beyond this   level nondiagnostic. Normal caliber main pulmonary artery.       Nodular consolidation within the lateral aspect right middle lobe may be   infectious or inflammatory.  However, given the nodular morphology, recommend   follow-up chest CT within 3 months.       Moderate size hiatal hernia. CXR 4/22/2022  Trace bilateral pleural effusions.      VQ Scan 4/23/22  Scattered heterogeneous perfusion is seen, with small defects at the lung   bases, that appear to be matched to the pleuroparenchymal opacity at the lung bases on recent chest x-ray. IMPRESSION: Intermediate or indeterminate probability for pulmonary embolus, given the triple matched defect at the bases      CXR 4/24/22  Small bilateral pleural effusions, with suspected overlying atelectasis. Evolving pneumonia is a differential possibility. ECHO 4/22/22 EF > 65% Severe AS  Grade I DD  SPAP 65     ASSESSMENT:  · Dyspnea on exertion - may be pulmonary in etiology, some benefit from inhaled bronchodilators but cardiac certainly equally likely  · Chest discomfort, acute  · Severe aortic stenosis, new dx in April, I don't see where this was addressed or commented on  · COPD/centrilobular pulmonary emphysema - without exacerbation - mild on CT imaging, no PFT   · CKD  · pAFIB, not on AC - last rhythm was sinus   · Abnormal CT imaging - suggests inflammatory changes, doubt acute pneumonia  · Dysphagia/esophageal dysmotility: with h/o abnormal manometry with EGJ outflow obstruction & features of type II achalasia.  S/P EGD with botox 2/2021  · Never smoker      PLAN:  · Change Anoro to Trelegy   · Order for Albuterol nebs, needs nebulizer    · Full PFT, 6MWT   · Please obtain imaging from [facility] 4/12/21 CT and ask for comparison with our imaging  · Referral to Lyndsay Foster with [facility] for evaluation for severe aortic stenosis/possible TAVR.   · F/U with me in 3 months

## 2022-06-29 NOTE — TELEPHONE ENCOUNTER
Faxed request for imaging to be pushed to [facility]. Once rec'd, call radiology [phone] to request comparison per Dr. Daniel Garcia.

## 2022-06-29 NOTE — Clinical Note
Rodo Almanza -- I was wondering if you'd look at her ECHO and let me know what you think about the AS. Her symptoms don't seem likely to be all pulmonary. She is 80 and doesn't want lots done, but if she would benefit from TAVR, I think she is interested for quality of life. I asked for referral to you specifically b/c I think this is something you've done more of. .. Brandie  please correct me if I have that wrong. Thanks.   Say Pino

## 2022-06-29 NOTE — PATIENT INSTRUCTIONS
[address] [facility], [address] [facility], [place]  [address] , [address] [facility] [address]  [place], [address]  [phone]

## 2022-06-30 NOTE — TELEPHONE ENCOUNTER
Addendum      Signed by Chapo Naranjo MD on 06/30/22 1127    ADDENDUM:   Comparison made to CT chest images from 04/12/2021. The nodular   consolidation in the right middle lobe laterally is new and again    follow-up   chest CT in 3 months is advised.

## 2022-07-05 NOTE — TELEPHONE ENCOUNTER
Patient and I will discuss repeat imaging at her 3 month f/u visit. Please add \"abnormal CT\" & \"f/u TAVR\" to the visit reason.

## 2022-07-05 NOTE — PROGRESS NOTES
Per Dr. Tera Shepherd, can you set pt up to see him in office as a new patient for aortic stenosis either at [place] in a TAVR spot or [place] on 7/15/22? If pt prefers [place], please forward to [facility]  to schedule. If pt prefers [facility], Dr. Tera Shepherd is not back in [facility] until 8/23/22 and forward to [facility].   Nancy DWYER

## 2022-07-06 ENCOUNTER — TELEPHONE (OUTPATIENT)
Dept: CARDIOLOGY CLINIC | Age: 87
End: 2022-07-06

## 2022-07-06 NOTE — TELEPHONE ENCOUNTER
Spoke with [facility] at [facility] at [phone]. The pt was at a doctor appt, and the RN for the pt was passing meds. Informed facility we will try again at another time to speak with either pt or RN.

## 2022-07-06 NOTE — TELEPHONE ENCOUNTER
Pt is in a living assistant,  pt prefers [place].  Per Dr. Isael Gifford, can you set pt up to see him in office as a new patient for aortic stenosis either

## 2022-07-06 NOTE — TELEPHONE ENCOUNTER
Per Dr. Winnie Carlson, can you set pt up to see him in office as a new patient for aortic stenosis either at [facility] in a TAVR spot or [facility] on 7/15/22? If pt prefers [facility], please forward to [place]  to schedule. If pt prefers [facility], Dr. Winnie Carlson is not back in [facility] until 8/23/22 and forward to [facility].   Nancy DWYER

## 2022-07-07 ENCOUNTER — APPOINTMENT (OUTPATIENT)
Dept: GENERAL RADIOLOGY | Age: 87
End: 2022-07-07
Payer: MEDICARE

## 2022-07-07 ENCOUNTER — APPOINTMENT (OUTPATIENT)
Dept: VASCULAR LAB | Age: 87
End: 2022-07-07
Payer: MEDICARE

## 2022-07-07 ENCOUNTER — HOSPITAL ENCOUNTER (EMERGENCY)
Age: 87
Discharge: HOME | End: 2022-07-07
Payer: MEDICARE

## 2022-07-07 VITALS
HEIGHT: 63 IN | RESPIRATION RATE: 18 BRPM | WEIGHT: 148 LBS | HEART RATE: 61 BPM | BODY MASS INDEX: 26.22 KG/M2 | DIASTOLIC BLOOD PRESSURE: 62 MMHG | SYSTOLIC BLOOD PRESSURE: 130 MMHG | OXYGEN SATURATION: 94 % | TEMPERATURE: 97.8 F

## 2022-07-07 DIAGNOSIS — M79.604 RIGHT LEG PAIN: Primary | ICD-10-CM

## 2022-07-07 PROCEDURE — 93971 EXTREMITY STUDY: CPT

## 2022-07-07 PROCEDURE — 99284 EMERGENCY DEPT VISIT MOD MDM: CPT

## 2022-07-07 PROCEDURE — 73590 X-RAY EXAM OF LOWER LEG: CPT

## 2022-07-07 PROCEDURE — 6370000000 HC RX 637 (ALT 250 FOR IP): Performed by: PHYSICIAN ASSISTANT

## 2022-07-07 RX ORDER — METHOCARBAMOL 500 MG/1
500 TABLET, FILM COATED ORAL 2 TIMES DAILY
Qty: 20 TABLET | Refills: 0 | Status: SHIPPED | OUTPATIENT
Start: 2022-07-07 | End: 2022-07-17

## 2022-07-07 RX ORDER — METHOCARBAMOL 500 MG/1
500 TABLET, FILM COATED ORAL ONCE
Status: COMPLETED | OUTPATIENT
Start: 2022-07-07 | End: 2022-07-07

## 2022-07-07 RX ORDER — METHOCARBAMOL 500 MG/1
500 TABLET, FILM COATED ORAL 2 TIMES DAILY
Qty: 20 TABLET | Refills: 0 | Status: SHIPPED | OUTPATIENT
Start: 2022-07-07 | End: 2022-07-07 | Stop reason: SDUPTHER

## 2022-07-07 RX ADMIN — METHOCARBAMOL 500 MG: 500 TABLET ORAL at 14:09

## 2022-07-07 ASSESSMENT — PAIN - FUNCTIONAL ASSESSMENT
PAIN_FUNCTIONAL_ASSESSMENT: 0-10
PAIN_FUNCTIONAL_ASSESSMENT: NONE - DENIES PAIN

## 2022-07-07 ASSESSMENT — PAIN DESCRIPTION - ORIENTATION: ORIENTATION: RIGHT

## 2022-07-07 ASSESSMENT — PAIN DESCRIPTION - LOCATION: LOCATION: ANKLE;LEG

## 2022-07-07 ASSESSMENT — PAIN SCALES - GENERAL: PAINLEVEL_OUTOF10: 6

## 2022-07-07 NOTE — ED PROVIDER NOTES
**ADVANCED PRACTICE PROVIDER, I HAVE EVALUATED THIS [facility]  ED  EMERGENCY DEPARTMENT ENCOUNTER      Pt Name: Elida Garcia  [place]  Birthdate 1930  Date of evaluation: 2022  Provider: SANKET Castro      Chief Complaint:    Chief Complaint   Patient presents with    Leg Swelling     right leg swelling and discoloration, noticed a couple weeks ago. bruising along inner aspect of ankle, denies injury or known cause. Nursing Notes, Past Medical Hx, Past Surgical Hx, Social Hx, Allergies, and Family Hx were all reviewed and agreed with or any disagreements were addressed in the HPI.    HPI: (Location, Duration, Timing, Severity, Quality, Assoc Sx, Context, Modifying factors)    Chief Complaint of right leg pain and swelling. This is a  80 y.o. female who presents via EMS from skilled nursing facility complaining of right leg pain and swelling. The patient states that the pain initially was in her left lower leg which has now begun in her right lower leg. She has associated swelling and bruising. She denies any history of blood clots. No recent travel. She states she does wear CARRILLO hose on a daily basis. She does have past medical history of afib, COPD, and hypertension. She is not on a blood thinner. She states her her symptoms have been ongoing for couple of weeks. She does have slight discoloration that appears to be a healing bruise. She denies any known injuries. No recent falls. No recent fevers, chills, chest pain, shortness of breath or coughing. She denies any abdominal pain, nausea or vomiting. PastMedical/Surgical History:      Diagnosis Date    Arthritis     Atrial fibrillation ([address]     COPD (chronic obstructive pulmonary disease) [address] [address]     Hypertension      No past surgical history on file.     Medications:  Discharge Medication List as of 2022  2:22 PM      CONTINUE these medications which have NOT CHANGED Details   albuterol sulfate HFA (VENTOLIN HFA) 108 (90 Base) MCG/ACT inhaler Inhale 2 puffs into the lungs every 6 hours as neededHistorical Med      fluticasone-umeclidin-vilant (TRELEGY ELLIPTA) 100-62.5-25 MCG/INH AEPB Inhale 1 puff into the lungs daily, Disp-1 each, R-5Print      albuterol (ACCUNEB) 1.25 MG/3ML nebulizer solution Inhale 3 mLs into the lungs every 4 hours as needed for Wheezing or Shortness of Breath, Disp-360 mL, R-3Print      busPIRone (BUSPAR) 5 MG tablet Take 5 mg by mouth 2 times dailyHistorical Med      QUEtiapine (SEROQUEL) 25 MG tablet Take 25 mg by mouth nightly as neededHistorical Med      metoprolol tartrate (LOPRESSOR) 25 MG tablet Take 1 tablet by mouth 2 times daily, Disp-60 tablet, R-0NO PRINT      furosemide (LASIX) 40 MG tablet Take 40 mg by mouth dailyHistorical Med      hydroxychloroquine (PLAQUENIL) 200 MG tablet Take by mouth daily      aspirin 81 MG chewable tablet Take 81 mg by mouth daily      calcium carbonate (OSCAL) 500 MG TABS tablet Take 500 mg by mouth daily      potassium chloride (KLOR-CON) 20 MEQ packet Take 20 mEq by mouth 2 times daily               Review of Systems:  (2-9 systems needed)    \"Positives and Pertinent negatives as per HPI\"    Physical Exam:  Physical Exam  Vitals and nursing note reviewed. Constitutional:       Appearance: Normal appearance. She is not diaphoretic. HENT:      Head: Normocephalic and atraumatic. Nose: Nose normal.   Eyes:      General:         Right eye: No discharge. Left eye: No discharge. Pulmonary:      Effort: Pulmonary effort is normal. No respiratory distress. Musculoskeletal:         General: Normal range of motion. Cervical back: Normal range of motion and neck supple. Right lower leg: Edema present. Left lower leg: Edema present. Comments: R>L swelling with associated healing bruise on anterior shin   Skin:     General: Skin is warm and dry. Coloration: Skin is not pale. SYSTEM PROVIDED HISTORY: Swelling and bruising. TECHNOLOGIST PROVIDED HISTORY: Reason for exam:->swelling and bruising. Reason for Exam: Swelling, bruising. FINDINGS: Frontal and lateral views of the proximal and distal aspects of the right tibia and fibula were performed. There is no acute fracture or dislocation. No periosteal reaction or osseous destruction is evident. Is mild osteoarthritis at both the right knee and ankle joints. There is mild diffuse soft tissue swelling. Atherosclerotic calcifications and phleboliths are noted. No radiopaque foreign body is seen. Mild diffuse soft tissue swelling of the right lower extremity, without acute osseous abnormality radiopaque foreign body. CT CHEST PULMONARY EMBOLISM W CONTRAST    Addendum Date: 6/30/2022    ADDENDUM: Comparison made to CT chest images from 04/12/2021. The nodular consolidation in the right middle lobe laterally is new and again follow-up chest CT in 3 months is advised. Result Date: 6/30/2022  EXAMINATION: CTA OF THE CHEST 6/25/2022 3:21 am TECHNIQUE: CTA of the chest was performed after the administration of intravenous contrast.  Multiplanar reformatted images are provided for review. MIP images are provided for review. Automated exposure control, iterative reconstruction, and/or weight based adjustment of the mA/kV was utilized to reduce the radiation dose to as low as reasonably achievable. COMPARISON: 11/13/2016 HISTORY: ORDERING SYSTEM PROVIDED HISTORY: cough, chest discomfort (resolved) TECHNOLOGIST PROVIDED HISTORY: Reason for exam:->cough, chest discomfort (resolved) Decision Support Exception - unselect if not a suspected or confirmed emergency medical condition->Emergency Medical Condition (MA) Reason for Exam: chest pain and discomfort FINDINGS: Pulmonary Arteries: Pulmonary evaluation limited due to contrast bolus timing. No evidence of central or lobar pulmonary artery filling defects.  Evaluation beyond this level nondiagnostic. Normal caliber main pulmonary artery. Mediastinum: Cardiomegaly. No pericardial effusion. Normal caliber thoracic aorta. No mediastinal, hilar, or axillary lymphadenopathy. Lungs/pleura: Nodular consolidation within the lateral aspect right middle lobe may be infectious or inflammatory. Bibasilar atelectasis. No effusion or pneumothorax. Upper Abdomen: Moderate size hiatal hernia. Soft Tissues/Bones: Degenerative changes of the spine with chronic appearing compression deformity involving T11 and T12. Pulmonary evaluation limited due to contrast bolus timing. No evidence of central or lobar pulmonary artery filling defects. Evaluation beyond this level nondiagnostic. Normal caliber main pulmonary artery. Nodular consolidation within the lateral aspect right middle lobe may be infectious or inflammatory. However, given the nodular morphology, recommend follow-up chest CT within 3 months. Moderate size hiatal hernia. VL Extremity Venous Right    Result Date: 7/7/2022  Vascular Lower Extremities DVT Study Procedure -- PRELIMINARY SONOGRAPHER REPORT --   Demographics   Patient Name       Adelso Cassidy   Date of Study      07/07/2022         Gender              Female   Patient Number     [id number]         Date of Birth       12/12/1930   Visit Number       [id number]          Age                 80 year(s)   Accession Number   [id number]         Room Number         05   Corporate ID       [id number]            Sonographer         Fanny Wallace RVT   Ordering Physician Neida Curling,           Henri Fuentesma      Physician           Readers  Procedure Type of Study:   Veins:Lower Extremities DVT Study, VL EXTREMITY VENOUS DUPLEX RIGHT. Tech Comments Right Technically difficult exam due to swelling.  Limited visualization of the posterior tibial veins and peroneal veins due to limitations. Within the limits of this exam, there is no evidence of acute deep or superficial venous thrombosis. Left There is no evidence of deep venous thrombosis involving the common femoral vein. There is no previous exam for comparison. MEDICAL DECISION MAKING / ED COURSE:      Patient was evaluated in the emergency department today for right lower leg swelling. We did obtain a vascular ultrasound which shows no evidence of DVT or superficial thrombus. Is obtained an x-ray which shows no acute bony abnormality. The patient was given Robaxin for pain, with good relief. She is encouraged to continue wearing the compression stockings for symptom relief. She will follow-up with her primary care physician for further evaluation and treatment of her symptoms. Otherwise I will prescribe short course of Robaxin and plan to discharge her home. The patient is in agreement treatment plan. All questions are answered. Patient was given:  Medications   methocarbamol (ROBAXIN) tablet 500 mg (500 mg Oral Given 7/7/22 1409)       The patient tolerated their visit well. I evaluated the patient. The physician was available for consultation as needed. The patient and / or the family were informed of the results of any tests, a time was given to answer questions, a plan was proposed and they agreed with plan. CLINICAL IMPRESSION:  1. Right leg pain      No results found for this visit on 07/07/22. I estimate there is LOW risk for COMPARTMENT SYNDROME, DEEP VENOUS THROMBOSIS, SEPTIC ARTHRITIS, TENDON OR NEUROVASCULAR INJURY, thus I consider the discharge disposition reasonable. Harshad Diaz and I have discussed the diagnosis and risks, and we agree with discharging home to follow-up with their primary doctor or the referral orthopedist. We also discussed returning to the Emergency Department immediately if new or worsening symptoms occur.  We have discussed the symptoms which are most concerning (e.g., changing or worsening pain, numbness, weakness) that necessitate immediate return. Final Impression    1. Right leg pain        Blood pressure 130/62, pulse 61, temperature 97.8 °F (36.6 °C), temperature source Oral, resp. rate 18, height 5' 3\" (1.6 m), weight 148 lb (67.1 kg), SpO2 94 %.       DISPOSITION Decision To Discharge 07/07/2022 02:02:05 PM      PATIENT REFERRED TO:  [facility]  ED  [address]  Go to   If symptoms worsen    [facility]  [address] [place] [address] & [facility]  [address] [place] [address],[address]            DISCHARGE MEDICATIONS:  Discharge Medication List as of 7/7/2022  2:22 PM          DISCONTINUED MEDICATIONS:  Discharge Medication List as of 7/7/2022  2:22 PM                 (Please note the MDM and HPI sections of this note were completed with a voice recognition program.  Efforts were made to edit the dictations but occasionally words are mis-transcribed.)    Electronically signed, Suzzette Bumpers, [place],           Suzzette Bumpers, [place]  07/07/22 [id number]

## 2022-07-07 NOTE — ED NOTES
[facility] to [facility]. All questions answered prior to discharge. Stable at discharge.      Roopa Diaz RN  07/07/22 0177

## 2022-07-07 NOTE — TELEPHONE ENCOUNTER
RN Tucker Angelucci, I spoke with [facility]. They could not get the pt here at [address] [place] [address] for a full 30 mins time slot on 8/23/22 with dce at [facility]. The schedule did not allow for a full 30 min appt after 8a. I scheduled the pt for 1045a. The facility stated this time would be better for the pt.

## 2022-07-07 NOTE — CARE COORDINATION
Referred to patient for transport home. [facility] to transport at 1430. No other needs at this time.   Electronically signed by DAGOBERTO Castro LISW-S on 7/7/2022 at 2:08 PM

## 2022-07-15 RX ORDER — FLUTICASONE FUROATE, UMECLIDINIUM BROMIDE AND VILANTEROL TRIFENATATE 100; 62.5; 25 UG/1; UG/1; UG/1
1 POWDER RESPIRATORY (INHALATION) DAILY
Refills: 10 | OUTPATIENT
Start: 2022-07-15 | End: 2025-04-09

## 2022-08-14 NOTE — ED PROVIDER NOTES
[address] ED  EMERGENCY DEPARTMENT ENCOUNTER      Pt Name: Tatianna Arteaga  MRN: [id number]  [place] 12/12/1930  Date of evaluation: 5/1/2022  Provider: Effie Gillespie DO    CHIEF COMPLAINT       Chief Complaint   Patient presents with    Altered Mental Status     From [facility]. Sent in because patient was yelling all night. Pt has dementia. HISTORY OF PRESENT ILLNESS   (Location/Symptom, Timing/Onset, Context/Setting, Quality, Duration, Modifying Factors, Severity)  Note limiting factors. Tatianna Arteaga is a 80 y.o. female who presents to the emergency department complaining of patient brought in due to agitation (yelling at the staff members from the client. Patient recently discharged from hospital on 4/25, she initially presented during evaluation for hip pain after a fall imaging was negative however she is slightly hypoxic. In hospital patient was seen by pulmonology, was started on 2 L nasal cannula oxygen, at times is refusing to wear this, had VQ scan performed and seen by pulmonology did not feel patient required anticoagulation at that time. Patient is oriented to person and place. States that she was yelling at the staff members yesterday evening and overnight as she feels like nothing gets done at her current facility. Patient denies any pain including headache chest pain abdominal pain nausea vomiting. Does state that her urine looks an abnormal color but denies dysuria. Nursing Notes were reviewed. PAST MEDICAL HISTORY     Past Medical History:   Diagnosis Date    Arthritis     Atrial fibrillation ([facility] [facility]     COPD (chronic obstructive pulmonary disease) ([facility] [facility]     Hypertension          SURGICAL HISTORY     No past surgical history on file.       CURRENT MEDICATIONS       Previous Medications    ASPIRIN 81 MG CHEWABLE TABLET    Take 81 mg by mouth daily    BUSPIRONE (BUSPAR) 5 MG TABLET    Take 5 mg by mouth 2 times daily    CALCIUM CARBONATE (OSCAL) 500 MG TABS TABLET    Take 500 mg by mouth daily    FUROSEMIDE (LASIX) 40 MG TABLET    Take 40 mg by mouth daily    HYDROXYCHLOROQUINE (PLAQUENIL) 200 MG TABLET    Take by mouth daily    METOPROLOL TARTRATE (LOPRESSOR) 25 MG TABLET    Take 1 tablet by mouth 2 times daily    POTASSIUM CHLORIDE (KLOR-CON) 20 MEQ PACKET    Take 20 mEq by mouth 2 times daily    QUETIAPINE (SEROQUEL) 25 MG TABLET    Take 25 mg by mouth nightly as needed       ALLERGIES     Sulfa antibiotics    FAMILY HISTORY     No family history on file. SOCIAL HISTORY       Social History     Socioeconomic History    Marital status:      Spouse name: Not on file    Number of children: Not on file    Years of education: Not on file    Highest education level: Not on file   Occupational History    Not on file   Tobacco Use    Smoking status: Never Smoker    Smokeless tobacco: Not on file   Vaping Use    Vaping Use: Never used   Substance and Sexual Activity    Alcohol use: No    Drug use: Never    Sexual activity: Not on file   Other Topics Concern    Not on file   Social History Narrative    Not on file     Social Determinants of Health     Financial Resource Strain:     Difficulty of Paying Living Expenses: Not on file   Food Insecurity:     Worried About Running Out of Food in the Last Year: Not on file    Jamison of Food in the Last Year: Not on file   Transportation Needs:     Lack of Transportation (Medical): Not on file    Lack of Transportation (Non-Medical):  Not on file   Physical Activity:     Days of Exercise per Week: Not on file    Minutes of Exercise per Session: Not on file   Stress:     Feeling of Stress : Not on file   Social Connections:     Frequency of Communication with Friends and Family: Not on file    Frequency of Social Gatherings with Friends and Family: Not on file    Attends Denominational Services: Not on file    Active Member of Clubs or Organizations: Not on file    Attends Club or Organization Meetings: Not on file    Marital Status: Not on file   Intimate Partner Violence:     Fear of Current or Ex-Partner: Not on file    Emotionally Abused: Not on file    Physically Abused: Not on file    Sexually Abused: Not on file   Housing Stability:     Unable to Pay for Housing in the Last Year: Not on file    Number of [place] in the Last Year: Not on file    Unstable Housing in the Last Year: Not on file       SCREENINGS        [place] Coma Scale  Eye Opening: Spontaneous  Best Verbal Response: Confused  Best Motor Response: Localizes pain  Rut Coma Scale Score: 13                   REVIEW OF SYSTEMS    (2-9 systems for level 4, 10 or more for level 5)   Review of Systems   Constitutional: Negative for chills and fever. HENT: Negative for congestion, rhinorrhea and sore throat. Respiratory: Negative for cough and shortness of breath. Cardiovascular: Negative for chest pain. Gastrointestinal: Negative for abdominal pain, nausea and vomiting. Genitourinary: Negative for decreased urine volume. Dark urine   Musculoskeletal: Negative for back pain, neck pain and neck stiffness. Skin: Negative for rash. Allergic/Immunologic: Negative for environmental allergies. Hematological: Negative for adenopathy. Psychiatric/Behavioral: Positive for agitation. Negative for confusion. PHYSICAL EXAM    (up to 7 for level 4, 8 or more for level 5)   RECENT VITALS:     Temp: 98.5 °F (36.9 °C),  Pulse: 66, Resp: 14, BP: (!) 174/79, SpO2: 90 %    Physical Exam  Constitutional:       General: She is not in acute distress. Appearance: She is not diaphoretic. HENT:      Head: Normocephalic and atraumatic. Eyes:      Pupils: Pupils are equal, round, and reactive to light. Neck:      Trachea: No tracheal deviation. Cardiovascular:      Rate and Rhythm: Normal rate and regular rhythm. Pulmonary:      Effort: Pulmonary effort is normal. No respiratory distress.    Abdominal: General: There is no distension. Palpations: Abdomen is soft. Musculoskeletal:         General: Normal range of motion. Cervical back: Normal range of motion and neck supple. Skin:     General: Skin is warm. Neurological:      Mental Status: Mental status is at baseline. DIAGNOSTIC RESULTS     EKG: All EKG's are interpreted by the Emergency Department Physician who either signs or Co-signs this chart in the absence of a cardiologist.          RADIOLOGY:   Non-plain film images such as CT, Ultrasound and MRI are read by the radiologist. Plain radiographic images are visualized and preliminarily interpreted by the emergency physician. Interpretation per the Radiologist below, if available at the time of this note:    XR CHEST PORTABLE   Final Result   Mild bibasilar atelectasis or airspace disease, improved on the right as   compared to prior. LABS:  Labs Reviewed   CBC WITH AUTO DIFFERENTIAL - Abnormal; Notable for the following components:       Result Value    Lymphocytes Absolute 0.7 (*)     All other components within normal limits   COMPREHENSIVE METABOLIC PANEL W/ REFLEX TO MG FOR LOW K - Abnormal; Notable for the following components:    BUN 31 (*)     GFR Non- 52 (*)     ALT 7 (*)     All other components within normal limits   URINALYSIS WITH MICROSCOPIC - Abnormal; Notable for the following components:    Urobilinogen, Urine 2.0 (*)     Mucus, UA Rare (*)     WBC, UA 6-9 (*)     Epithelial Cells, UA 6-10 (*)     All other components within normal limits   BLOOD GAS, VENOUS - Abnormal; Notable for the following components:    pO2, Manuel 59.2 (*)     HCO3, Venous 30.9 (*)     Base Excess, Manuel 5.5 (*)     Carboxyhemoglobin 3.0 (*)     All other components within normal limits   CULTURE, URINE       All other labs were within normal range or not returned as of this dictation.     EMERGENCY DEPARTMENT COURSE and DIFFERENTIAL DIAGNOSIS/MDM:   Karishma Dockery Surendra Torres is a 80 y.o. female who presents to the emergency department with the complaint of patient arrives from care facility after she was agitated overnight yelling at staff. Does have a history of agitation overnight mainly, she had episodes like this while she was in the hospital as well. Patient is reporting that she did yell at staff members due to them not taking good care of her. She is mildly hypertensive otherwise stable vitals excluding hypoxia off oxygen, most recently she was admitted found to need oxygen was started on 2 L on 2 L oxygen patient satting upper 90%. Respirations even unlabored. Will check urine studies, basic labs however I do feel patient is likely at her mental status baseline based on chart review. She voices no other specific complaint aside from dark-colored urine. No significant lab abnormality. Urine questionable for UTI will treat with Keflex. Otherwise patient is medically stable I do feel she is appropriate to be discharged back to her care facility. CRITICAL CARE TIME   Total Critical Care time was 0 minutes, excluding separately reportable procedures. There was a high probability of clinically significant/life threatening deterioration in the patient's condition which required my urgent intervention. Clinical concern   Intervention     CONSULTS:  None    PROCEDURES:  Unless otherwise noted below, none     Procedures        FINAL IMPRESSION      1. Agitation    2.  Acute cystitis without hematuria          DISPOSITION/PLAN   DISPOSITION  dc      PATIENT REFERRED TO:  [facility] [place] [facility]  [address] [address]  [phone]    If symptoms worsen    Jak Costello  [address] [facility] [address]  [address]  [phone]    Schedule an appointment as soon as possible for a visit in 2 days        DISCHARGE MEDICATIONS:  New Prescriptions    CEPHALEXIN (KEFLEX) 500 MG CAPSULE    Take 1 capsule by mouth 2 times daily for 7 days Controlled Substances Monitoring:     No flowsheet data found.     (Please note that portions of this note were completed with a voice recognition program.  Efforts were made to edit the dictations but occasionally words are mis-transcribed.)    Soraida Cespedes DO (electronically signed)  Attending Emergency Physician            Soraida Cespedes DO  05/01/22 [address]

## 2022-08-19 NOTE — PROGRESS NOTES
[address]  H+P  Consult  OP Visit  FU Visit   CC HX HPI   GEN  New patient for AS. Severe by recent echo. Worsening sob   AS  Ø CP. Worsening sob, mild orthopnea, pnd.     HTN  Ambulatory BP in good range. Ø HA/dizziness. CHOL  Last lipid reviewed. On max dose/tolerated statin. MED  Compliant with CV meds. Ø reported SA. HISTORY/ALLERGY/ROS   MEDHx  has a past medical history of Arthritis, Atrial fibrillation ([facility] [address] COPD (chronic obstructive pulmonary disease) ([facility] [address] and Hypertension. SURGHx  has no past surgical history on file. SOCHx  reports that she has never smoked. She has never used smokeless tobacco. She reports that she does not drink alcohol and does not use drugs. FAMHx family history is not on file.    ALLERG Haldol [haloperidol] and Sulfa antibiotics   ROS Full ROS obtained and negative except as mentioned in HPI   MEDICATIONS   Current Outpatient Medications   Medication Sig Dispense Refill    albuterol sulfate HFA (VENTOLIN HFA) 108 (90 Base) MCG/ACT inhaler Inhale 2 puffs into the lungs every 6 hours as needed      fluticasone-umeclidin-vilant (TRELEGY ELLIPTA) 100-62.5-25 MCG/INH AEPB Inhale 1 puff into the lungs daily 1 each 5    albuterol (ACCUNEB) 1.25 MG/3ML nebulizer solution Inhale 3 mLs into the lungs every 4 hours as needed for Wheezing or Shortness of Breath 360 mL 3    busPIRone (BUSPAR) 5 MG tablet Take 5 mg by mouth 2 times daily      QUEtiapine (SEROQUEL) 25 MG tablet Take 25 mg by mouth nightly as needed      metoprolol tartrate (LOPRESSOR) 25 MG tablet Take 1 tablet by mouth 2 times daily 60 tablet 0    furosemide (LASIX) 40 MG tablet Take 40 mg by mouth daily      hydroxychloroquine (PLAQUENIL) 200 MG tablet Take by mouth daily      aspirin 81 MG chewable tablet Take 81 mg by mouth daily      calcium carbonate (OSCAL) 500 MG TABS tablet Take 500 mg by mouth daily      potassium chloride (KLOR-CON) 20 MEQ packet Take 20 mEq by mouth 2 times daily       No current facility-administered medications for this visit. PHYSICAL EXAM   Vitals /68   Pulse 63   Ht 5' 3\" (1.6 m)   Wt 151 lb (68.5 kg)   SpO2 96%   BMI 26.75 kg/m²     Gen Alert, coop, no distress Heart  Rrr, 3/6   Head NC, AT, no abnorm Abd  Soft, NT, +BS, no mass, no OM   Eyes PER, conj/corn clear Ext  Ext nl, AT, no C/C/E   Nose Nares nl, no drain, NT Pulse 2+ and symmetric   Throat Lips, mucosa, tongue nl Skin Col/text/turg nl, no vis rash/les   Neck S/S, TM, NT, no bruit/JVD Psych Nl mood and affect   Lung CTA-B, unlabored, no DTP Lymph   No cervical or axillary LA   Ch wall NT, no deform Neuro  Nl gross M/S exam      CODING   SCI (71682) - AS  30-39 minutes preparing to see pt including review hx, tests, consults, perf exam, , educating pt, fam, caregiver, ordering meds/tests/procedures, referring and comm with pcps and other consultants, documenting info in EMR, interpreting results and communicating to fam and coordination of pt care.     SCRIBE   Nurse - NA   Doctor - NA   ASSESSMENT AND PLAN   *AS    Date EF Detail   Sx     severe   DATA   Most recent TTE, [facility] reviewed personally   NYHA   III   TTE 4/17 4/22   65% Mod AS MG 19  Sev AS - PV 3.97m/s, MG 39 (review of echo with PV>4.0), PASP 65mmHg   [facility]   None   Plan     Long discussion regarding options - med v TAVR  Patient would like to deliberate with family   *FOLLOWUP  Pending TAVR decision, if defers for now see back in 6 months

## 2022-08-23 ENCOUNTER — OFFICE VISIT (OUTPATIENT)
Dept: CARDIOLOGY CLINIC | Age: 87
End: 2022-08-23
Payer: MEDICARE

## 2022-08-23 VITALS
HEIGHT: 63 IN | DIASTOLIC BLOOD PRESSURE: 68 MMHG | WEIGHT: 151 LBS | HEART RATE: 63 BPM | OXYGEN SATURATION: 96 % | SYSTOLIC BLOOD PRESSURE: 122 MMHG | BODY MASS INDEX: 26.75 KG/M2

## 2022-08-23 DIAGNOSIS — R42 DIZZINESS: ICD-10-CM

## 2022-08-23 DIAGNOSIS — I35.0 NONRHEUMATIC AORTIC VALVE STENOSIS: Primary | ICD-10-CM

## 2022-08-23 PROCEDURE — 99214 OFFICE O/P EST MOD 30 MIN: CPT | Performed by: INTERNAL MEDICINE

## 2022-08-23 PROCEDURE — G8427 DOCREV CUR MEDS BY ELIG CLIN: HCPCS | Performed by: INTERNAL MEDICINE

## 2022-08-23 PROCEDURE — 1123F ACP DISCUSS/DSCN MKR DOCD: CPT | Performed by: INTERNAL MEDICINE

## 2022-08-23 PROCEDURE — G8417 CALC BMI ABV UP PARAM F/U: HCPCS | Performed by: INTERNAL MEDICINE

## 2022-08-23 PROCEDURE — 1036F TOBACCO NON-USER: CPT | Performed by: INTERNAL MEDICINE

## 2022-08-23 PROCEDURE — 1090F PRES/ABSN URINE INCON ASSESS: CPT | Performed by: INTERNAL MEDICINE

## 2022-08-29 RX ORDER — FLUTICASONE FUROATE, UMECLIDINIUM BROMIDE AND VILANTEROL TRIFENATATE 100; 62.5; 25 UG/1; UG/1; UG/1
1 POWDER RESPIRATORY (INHALATION) DAILY
Refills: 0 | OUTPATIENT
Start: 2022-08-29 | End: 2025-05-24

## 2022-09-27 ENCOUNTER — TELEPHONE (OUTPATIENT)
Dept: PULMONOLOGY | Age: 87
End: 2022-09-27

## 2022-09-27 DIAGNOSIS — R93.89 ABNORMAL CT OF THE CHEST: Primary | ICD-10-CM

## 2022-09-27 DIAGNOSIS — I27.82 OTHER CHRONIC PULMONARY EMBOLISM WITHOUT ACUTE COR PULMONALE (HCC): ICD-10-CM

## 2022-09-27 NOTE — TELEPHONE ENCOUNTER
Provision -[phone] called cancelled appt 9/28/22 3 mo f/u abn ct f/u TAVR same day 6min walk. Patient is currently inpt at [address] [facility] dx PNA. To be discharged later today. 6/29/22    ASSESSMENT:  Dyspnea on exertion - may be pulmonary in etiology, some benefit from inhaled bronchodilators but cardiac certainly equally likely  Chest discomfort, acute  Severe aortic stenosis, new dx in April, I don't see where this was addressed or commented on  COPD/centrilobular pulmonary emphysema - without exacerbation - mild on CT imaging, no PFT   CKD  pAFIB, not on AC - last rhythm was sinus   Abnormal CT imaging - suggests inflammatory changes, doubt acute pneumonia  Dysphagia/esophageal dysmotility: with h/o abnormal manometry with EGJ outflow obstruction & features of type II achalasia. S/P EGD with botox 2/2021  Never smoker      PLAN:  Change Anoro to Trelegy   Order for Albuterol nebs, needs nebulizer    Full PFT, 6MWT   Please obtain imaging from [facility] 4/12/21 CT and ask for comparison with our imaging  Referral to Nisa Fritz with [facility] for evaluation for severe aortic stenosis/possible TAVR.   F/U with me in 3 months

## 2022-10-14 NOTE — TELEPHONE ENCOUNTER
Tried to call [facility] regarding terri appt was transferred to nurse and call was disconnected. Will try back.

## 2022-10-25 NOTE — TELEPHONE ENCOUNTER
Spoke to April at [facility] and rescheduled pt. NH only provides transportation on Tuesdays and Thursdays. First available is 12/15/22. Will watch for cancellations to move forward. NH needs at least one day in advance for transportation.

## 2022-10-25 NOTE — TELEPHONE ENCOUNTER
Agree - please watch for cancellations to move forward. Please get interval CTPA pushed into PACS from [address]  (Of note, pt was started on Eliquis during admission for finding of chronic PE)  Please also push our last CT to [address] and ask their radiologist for comparison, RML nodular consolidation.

## 2022-10-31 NOTE — TELEPHONE ENCOUNTER
Pt scheduled for 11/1/22   Imaging from [facility] MAURIZIO VENCES pushed to [address] to reach [facility] will try again after lunch

## 2022-11-01 ENCOUNTER — OFFICE VISIT (OUTPATIENT)
Dept: PULMONOLOGY | Age: 87
End: 2022-11-01
Payer: MEDICARE

## 2022-11-01 VITALS
BODY MASS INDEX: 26.75 KG/M2 | DIASTOLIC BLOOD PRESSURE: 70 MMHG | RESPIRATION RATE: 18 BRPM | SYSTOLIC BLOOD PRESSURE: 130 MMHG | HEIGHT: 63 IN | OXYGEN SATURATION: 96 % | HEART RATE: 64 BPM

## 2022-11-01 DIAGNOSIS — I35.0 AORTIC VALVE STENOSIS, ETIOLOGY OF CARDIAC VALVE DISEASE UNSPECIFIED: ICD-10-CM

## 2022-11-01 DIAGNOSIS — R06.09 DYSPNEA ON EXERTION: Primary | ICD-10-CM

## 2022-11-01 PROCEDURE — 1036F TOBACCO NON-USER: CPT | Performed by: INTERNAL MEDICINE

## 2022-11-01 PROCEDURE — 1090F PRES/ABSN URINE INCON ASSESS: CPT | Performed by: INTERNAL MEDICINE

## 2022-11-01 PROCEDURE — G8484 FLU IMMUNIZE NO ADMIN: HCPCS | Performed by: INTERNAL MEDICINE

## 2022-11-01 PROCEDURE — 99214 OFFICE O/P EST MOD 30 MIN: CPT | Performed by: INTERNAL MEDICINE

## 2022-11-01 PROCEDURE — 1123F ACP DISCUSS/DSCN MKR DOCD: CPT | Performed by: INTERNAL MEDICINE

## 2022-11-01 PROCEDURE — G8427 DOCREV CUR MEDS BY ELIG CLIN: HCPCS | Performed by: INTERNAL MEDICINE

## 2022-11-01 PROCEDURE — G8417 CALC BMI ABV UP PARAM F/U: HCPCS | Performed by: INTERNAL MEDICINE

## 2022-11-01 RX ORDER — APIXABAN 5 MG/1
5 TABLET, FILM COATED ORAL
COMMUNITY
Start: 2022-10-21

## 2022-11-01 NOTE — PROGRESS NOTES
Pulmonary Follow-up Note  Chief Complaint: shortness of breath, Adelene Neat  Referring Provider: hospital f/u    Interval History 11/1/22  - admitted two times to [facility], first with chronic PE, then with aspiration pneumonia, treated with botox for achalasia. She remains unsure regarding TAVR. Interval history: d/c'd 4/25/22 after treatment for COPD, pneumonia. Returned toED on 6/25/22 with cough and acute difficulty breathing. Had CTPA. Treated with levaquin. However, symptoms are more suggestive of cardiac issue. She has progressive dyspnea on exertion. Pulmonary issues seem mild. AS is severe on ECHO, I don't see a valve area. She is interested in treatments that will help her live better, not necessarily longer. She is interested in hearing if TAVR is recommended and if it is, what is involved. Presenting history: 4/23/22 Vadim Melissa is a 80 y.o. female with a PMHx of COPD, paroxysmal AFIB and LE edema on lasix, who previously received all her care at [facility] but [place] moved [place] a higher leveled skilled nursing facility this year who presented to the ED on 4/22/2022 with moderate left hip pain after a fall in her room at her SNF after she lost her footing, worse with bearing weight; no associated numbness or head trauma. Additionally, she's had 1 week of worsening sob prompting a CXR the prior day that was normal.  Chronic RAFAELA at baseline. No baseline O2. Pt was requiring 2 L O2 in the ER and was unable to be weaned and so was admitted. ER was unable to obtain CTPA d/t elevated Cr so started therapeutic dose lovenox. Now states breathing feels fine. Reports she has never been on [facility] for pAFIB. reports that she has never smoked. She has never used smokeless tobacco.     Review of Systems:  dyspnea on exertion, RAFAELA, chest discomfort occasionally on exertion, with recent severe attack    Physical Exam:  Blood pressure 130/70, pulse 64, resp.  rate 18, height 5' 3\" (1.6 m), SpO2 96 %. Constitutional:  No acute distress. HENT:  Oropharynx is clear and moist.   Eyes: Pupils equal, round, and reactive to light. No scleral icterus. Neck: No tracheal deviation present. Cardiovascular: Loud murmur of AS.  ++ lower extremity edema. Pulmonary/Chest: No wheezes. No rhonchi. No rales. No decreased breath sounds. No accessory muscle usage or stridor. Musculoskeletal: No cyanosis. No clubbing. Skin: Skin is warm and dry. Psychiatric: Normal mood and affect. Data:   Imaging:      CTPA 9/23/22  1. No acute pulmonary aneurysm. 2. Stable chronic pulmonary embolism in the posterior basal segment left lower lobe. 3. New left greater than right bibasilar dependent airspace consolidation consistent with pneumonia, possibly aspiration. CXR 4/22/2022  Trace bilateral pleural effusions. VQ Scan 4/23/22  Scattered heterogeneous perfusion is seen, with small defects at the lung   bases, that appear to be matched to the pleuroparenchymal opacity at the lung bases on recent chest x-ray. IMPRESSION: Intermediate or indeterminate probability for pulmonary embolus, given the triple matched defect at the bases      CXR 4/24/22  Small bilateral pleural effusions, with suspected overlying atelectasis. Evolving pneumonia is a differential possibility. ECHO 4/22/22 EF > 65% Severe AS  Grade I DD  SPAP 65     ASSESSMENT:  Dyspnea on exertion   Severe aortic stenosis, new dx in April  COPD/centrilobular pulmonary emphysema - without exacerbation - mild on CT imaging, no PFT   CKD  pAFIB   Dysphagia/esophageal dysmotility: with h/o abnormal manometry with EGJ outflow obstruction & features of type II achalasia.  S/P EGD with botox 2/2021 & Sept 2022  Chronic PE   Never smoker      PLAN:  Trelegy 100  Albuterol PRN  On Eliquis   F/U with cardiology regarding TAVR  F/U with me in 6 months

## 2022-11-04 NOTE — TELEPHONE ENCOUNTER
Spoke with Jacinto and they are pushing images to [facility] and will complete the comparison.

## 2022-11-07 NOTE — TELEPHONE ENCOUNTER
Comparison from [address] [facility] complete       Addendum by Chris Jeff MD on 11/04/2022  9:34 AM EDT   Addendum: Previous outside CT chest from 6/25/2022 from [facility] now   available for comparison. 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 Left greater than right bibasilar airspace disease new since June 2022 and   concerning for pneumonia, possible aspiration. On previous CT from 6/25/2022, focal nodular consolidation in the lateral   segment right middle lobe was described. This has resolved since previous   CT.

## 2022-12-03 PROBLEM — I35.0 SEVERE AORTIC STENOSIS: Status: ACTIVE | Noted: 2022-01-01

## 2022-12-03 PROBLEM — I50.33 ACUTE ON CHRONIC DIASTOLIC CHF (CONGESTIVE HEART FAILURE) (HCC): Status: ACTIVE | Noted: 2022-01-01

## 2022-12-03 NOTE — ED PROVIDER NOTES
Tarik Blanc is a 80year old female with a history of COPD and CHF who presents to the ED with increased SOB x3 days. She is normally on oxygen 3 liters by nasal canula. EMS reports that when they arrived her oxygen saturation was 83%, but she had just removed her nasal canula to brush her teeth. She is awake and alert, in no acute distress. She denies chest pain. EMS reports that they put her on a non-rebreather mask and her oxygen saturation is nearly 100% at the time of arrival.   Patient reports that she has shingles \"in my eye\" and has obvious bruising and chemosis at the time of arrival.      Pulse 72   Temp 98.3 °F (36.8 °C)   Resp 18   SpO2 100%     I have reviewed the following from the nursing documentation:      Prior to Admission medications    Medication Sig Start Date End Date Taking?  Authorizing Provider   ELIQUIS 5 MG TABS tablet Take 5 mg by mouth 10/21/22   Historical Provider, MD   fluticasone-umeclidin-vilant (TRELEGY ELLIPTA) 100-62.5-25 MCG/INH AEPB Inhale 1 puff into the lungs daily 12/23/22   Dee Dee Bhatt PA-C   albuterol sulfate HFA (PROVENTIL;VENTOLIN;PROAIR) 108 (90 Base) MCG/ACT inhaler Inhale 2 puffs into the lungs every 6 hours as needed 1/21/22   Historical Provider, MD   albuterol (ACCUNEB) 1.25 MG/3ML nebulizer solution Inhale 3 mLs into the lungs every 4 hours as needed for Wheezing or Shortness of Breath 6/29/22   Valente Kemp MD   busPIRone (BUSPAR) 5 MG tablet Take 5 mg by mouth 2 times daily    Historical Provider, MD   QUEtiapine (SEROQUEL) 25 MG tablet Take 25 mg by mouth nightly as needed    Historical Provider, MD   metoprolol tartrate (LOPRESSOR) 25 MG tablet Take 1 tablet by mouth 2 times daily 4/25/22   Josy Paz MD   furosemide (LASIX) 40 MG tablet Take 40 mg by mouth daily    Historical Provider, MD   hydroxychloroquine (PLAQUENIL) 200 MG tablet Take by mouth daily    Historical Provider, MD   aspirin 81 MG chewable tablet Take 81 mg by mouth daily    Historical Provider, MD   calcium carbonate (OSCAL) 500 MG TABS tablet Take 500 mg by mouth daily    Historical Provider, MD   potassium chloride (KLOR-CON) 20 MEQ packet Take 20 mEq by mouth 2 times daily    Historical Provider, MD       Allergies as of 12/03/2022 - Fully Reviewed 12/03/2022   Allergen Reaction Noted    Haldol [haloperidol]  06/29/2022    Sulfa antibiotics Rash 11/13/2016       Past Medical History:   Diagnosis Date    Arthritis     Atrial fibrillation ([facility] [address]     COPD (chronic obstructive pulmonary disease) ([facility]     Hypertension         Surgical History: History reviewed. No pertinent surgical history. Family History:  History reviewed. No pertinent family history. Social History     Socioeconomic History    Marital status:      Spouse name: Not on file    Number of children: Not on file    Years of education: Not on file    Highest education level: Not on file   Occupational History    Not on file   Tobacco Use    Smoking status: Never    Smokeless tobacco: Never   Vaping Use    Vaping Use: Never used   Substance and Sexual Activity    Alcohol use: No    Drug use: Never    Sexual activity: Not on file   Other Topics Concern    Not on file   Social History Narrative    Not on file     Social Determinants of Health     Financial Resource Strain: Not on file   Food Insecurity: Not on file   Transportation Needs: Not on file   Physical Activity: Not on file   Stress: Not on file   Social Connections: Not on file   Intimate Partner Violence: Not on file   Housing Stability: Not on file         Review of Systems   Constitutional:  Negative for activity change, appetite change, chills, diaphoresis, fatigue, fever and unexpected weight change. HENT:  Negative for congestion, ear pain, mouth sores, rhinorrhea, sinus pressure, sore throat, tinnitus, trouble swallowing and voice change. Eyes:  Negative for photophobia, pain, discharge, redness and visual disturbance. Respiratory:  Positive for shortness of breath. Negative for apnea, cough, chest tightness, wheezing and stridor. Cardiovascular:  Negative for chest pain, palpitations and leg swelling. Gastrointestinal:  Negative for abdominal distention, abdominal pain, blood in stool, constipation, diarrhea, nausea, rectal pain and vomiting. Genitourinary:  Negative for difficulty urinating, dyspareunia, dysuria, flank pain, frequency, genital sores, menstrual problem, pelvic pain, urgency, vaginal bleeding, vaginal discharge and vaginal pain. Musculoskeletal:  Negative for arthralgias, back pain, joint swelling, neck pain and neck stiffness. Skin:  Negative for color change and rash. Neurological:  Negative for dizziness, tremors, seizures, syncope, facial asymmetry, speech difficulty, weakness, light-headedness, numbness and headaches. Hematological:  Negative for adenopathy. Does not bruise/bleed easily. Psychiatric/Behavioral:  Negative for agitation, confusion, dysphoric mood, hallucinations, self-injury, sleep disturbance and suicidal ideas. All other systems reviewed and are negative. Physical Exam  Constitutional:       General: She is not in acute distress. Appearance: She is well-developed. HENT:      Head: Normocephalic and atraumatic. Left Ear: External ear normal.      Mouth/Throat:      Pharynx: No oropharyngeal exudate. Eyes:      General:         Right eye: No discharge. Left eye: No discharge. Conjunctiva/sclera: Conjunctivae normal.      Pupils: Pupils are equal, round, and reactive to light. Neck:      Vascular: No JVD. Trachea: No tracheal deviation. Cardiovascular:      Rate and Rhythm: Normal rate and regular rhythm. Heart sounds: Normal heart sounds. No murmur heard. No friction rub. No gallop. Pulmonary:      Effort: Pulmonary effort is normal. No respiratory distress. Breath sounds: No stridor.  Examination of the right-upper field reveals wheezing. Examination of the left-upper field reveals wheezing. Examination of the right-middle field reveals wheezing. Examination of the left-middle field reveals wheezing. Examination of the right-lower field reveals decreased breath sounds. Examination of the left-lower field reveals decreased breath sounds. Decreased breath sounds and wheezing present. No rales. Chest:      Chest wall: No tenderness. Abdominal:      General: Bowel sounds are normal. There is no distension. Palpations: Abdomen is soft. There is no mass. Tenderness: There is no abdominal tenderness. There is no guarding or rebound. Musculoskeletal:         General: No tenderness. Normal range of motion. Cervical back: Normal range of motion. Lymphadenopathy:      Cervical: No cervical adenopathy. Skin:     General: Skin is warm and dry. Findings: No rash. Neurological:      Mental Status: She is alert and oriented to person, place, and time. Cranial Nerves: No cranial nerve deficit. Motor: No abnormal muscle tone. Coordination: Coordination normal.      Deep Tendon Reflexes: Reflexes normal.   Psychiatric:         Behavior: Behavior normal.         Thought Content:  Thought content normal.         Judgment: Judgment normal.        Procedures     MDM  Results for orders placed or performed during the hospital encounter of 12/03/22   CBC with Auto Differential   Result Value Ref Range    WBC 11.1 (H) 4.0 - 11.0 K/uL    RBC 4.08 4.00 - 5.20 M/uL    Hemoglobin 11.7 (L) 12.0 - 16.0 g/dL    Hematocrit 37.0 36.0 - 48.0 %    MCV 90.8 80.0 - 100.0 fL    MCH 28.6 26.0 - 34.0 pg    MCHC 31.5 31.0 - 36.0 g/dL    RDW 14.5 12.4 - 15.4 %    Platelets 931 835 - 948 K/uL    MPV 6.5 5.0 - 10.5 fL    Neutrophils % 82.7 %    Lymphocytes % 8.2 %    Monocytes % 8.4 %    Eosinophils % 0.4 %    Basophils % 0.3 %    Neutrophils Absolute 9.2 (H) 1.7 - 7.7 K/uL    Lymphocytes Absolute 0.9 (L) 1.0 - 5.1 K/uL Monocytes Absolute 0.9 0.0 - 1.3 K/uL    Eosinophils Absolute 0.0 0.0 - 0.6 K/uL    Basophils Absolute 0.0 0.0 - 0.2 K/uL   Comprehensive Metabolic Panel   Result Value Ref Range    Sodium 135 (L) 136 - 145 mmol/L    Potassium 4.3 3.5 - 5.1 mmol/L    Chloride 95 (L) 99 - 110 mmol/L    CO2 33 (H) 21 - 32 mmol/L    Anion Gap 7 3 - 16    Glucose 107 (H) 70 - 99 mg/dL    BUN 15 7 - 20 mg/dL    Creatinine 0.9 0.6 - 1.2 mg/dL    Est, Glom Filt Rate 60 (A) >60    Calcium 8.9 8.3 - 10.6 mg/dL    Total Protein 7.2 6.4 - 8.2 g/dL    Albumin 3.8 3.4 - 5.0 g/dL    Albumin/Globulin Ratio 1.1 1.1 - 2.2    Total Bilirubin 0.4 0.0 - 1.0 mg/dL    Alkaline Phosphatase 90 40 - 129 U/L    ALT <5 (L) 10 - 40 U/L    AST 14 (L) 15 - 37 U/L   Troponin   Result Value Ref Range    Troponin <0.01 <0.01 ng/mL   Brain Natriuretic Peptide   Result Value Ref Range    Pro-BNP 4,642 (H) 0 - 449 pg/mL   Lactate, Sepsis   Result Value Ref Range    Lactic Acid, Sepsis 0.9 0.4 - 1.9 mmol/L   EKG 12 Lead   Result Value Ref Range    Ventricular Rate 70 BPM    Atrial Rate 70 BPM    P-R Interval 176 ms    QRS Duration 82 ms    Q-T Interval 408 ms    QTc Calculation (Bazett) 440 ms    P Axis 65 degrees    R Axis 76 degrees    T Axis 63 degrees    Diagnosis       Normal sinus rhythmPossible Left atrial enlargementSeptal infarct , age undeterminedAbnormal ECGWhen compared with ECG of 25-JUN-2022 01:28,T wave inversion now evident in Anterior leads       I spoke with Dr. Noah Matamoros, hospitalist. We thoroughly discussed the history, physical exam, laboratory and imaging studies, as well as, emergency department course. Based upon that discussion, we've decided to admit Kee Diaz for further observation and evaluation of Harshad Diaz's dyspnea.   As I have deemed necessary from their history, physical, and studies, I have considered and evaluated Harshad Diaz for the following diagnoses:  ACUTE CORONARY SYNDROME, CHRONIC OBSTRUCTIVE PULMONARY DISEASE, CONGESTIVE HEART FAILURE, PERICARDIAL TAMPONADE, PNEUMONIA, PNEUMOTHORAX, PULMONARY EMBOLISM, SEPSIS, and THORACIC DISSECTION. FINAL IMPRESSION  1. Dyspnea, unspecified type    2. Congestive heart failure, unspecified HF chronicity, unspecified heart failure type ([address]    3. Acute pulmonary edema (HCC)        Vitals:  Blood pressure (!) 198/90, pulse 75, temperature 98.3 °F (36.8 °C), resp. rate 24, SpO2 99 %. Radiology  XR CHEST (2 VW)    Result Date: 12/3/2022  CHF with mild pulmonary edema. EKG Interpretation. The Ekg interpreted by me in the absence of a cardiologist shows. NSR with a rate of 70  Axis is   Normal  QTc is  within an acceptable range  Intervals and Durations are unremarkable. No specific ST-T wave changes appreciated. No evidence of acute ischemia. No old EKGs available for comparison.            Dajuan Grajeda MD  12/03/22 [id number]

## 2022-12-03 NOTE — PLAN OF CARE
PLAN OF CARE    Received request for inpatient admission. Admitting provider Dr Von Oliver notified.     Argelia Bolden MD  12/3/2022  12:21 PM

## 2022-12-03 NOTE — H&P
Hospital Medicine History & Physical      PCP: Kieran Bejarano    Date of Admission: 12/3/2022    Date of Service: Pt seen/examined on 12/3/2022 and Admitted to Inpatient with expected LOS greater than two midnights due to medical therapy. Chief Complaint:  SOB    History Of Present Illness:   80 y.o. female who presented to [facility] with SOB. PMHx significant for COPD, Chronic Diastolic CHF, Severe AS, Paroxysmal Atrial Fibrillation and HTN. She is followed by Pulmonology Dr. Leonie Boyce. She recently was evaluated by Cardiology for possible TAVR but she requested more time to discuss with her family. She presents with 2-3 days of worsening SOB, not associated with any chest pain, dizziness, N/V. She denies any significant LE edema. 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 She reports treatment was at [facility] but no records available. She reports she has had intra-occular injections which resulted in diffuse conjunctival hemorrhage, chemosis and oz-orbital ecchymosis. She was also treated with 15 day course of Valtrex (through 12/8/22), with multiple eyedrops to right eye and erythromycin ointment to contralateral eye. Past Medical History:        Diagnosis Date    Arthritis     Atrial fibrillation ([address]     COPD (chronic obstructive pulmonary disease) ([address]     Hypertension        Past Surgical History:    History reviewed. No pertinent surgical history. Medications Prior to Admission:   Prior to Admission medications    Medication Sig Start Date End Date Taking?  Authorizing Provider   ELIQUIS 5 MG TABS tablet Take 5 mg by mouth 10/21/22   Historical Provider, MD   fluticasone-umeclidin-vilant (TRELEGY ELLIPTA) 248-05.6-40 MCG/INH AEPB Inhale 1 puff into the lungs daily 12/23/22   [facility] ALIZE Bhatt PA-C   albuterol sulfate HFA (PROVENTIL;VENTOLIN;PROAIR) 108 (90 Base) MCG/ACT inhaler Inhale 2 puffs into the lungs every 6 hours as needed 1/21/22   Historical Provider, MD   albuterol (ACCUNEB) 1.25 MG/3ML nebulizer solution Inhale 3 mLs into the lungs every 4 hours as needed for Wheezing or Shortness of Breath 6/29/22   Magdi Cobos MD   busPIRone (BUSPAR) 5 MG tablet Take 5 mg by mouth 2 times daily    Historical Provider, MD   QUEtiapine (SEROQUEL) 25 MG tablet Take 25 mg by mouth nightly as needed    Historical Provider, MD   metoprolol tartrate (LOPRESSOR) 25 MG tablet Take 1 tablet by mouth 2 times daily 4/25/22   Indira Handy MD   furosemide (LASIX) 40 MG tablet Take 40 mg by mouth daily    Historical Provider, MD   hydroxychloroquine (PLAQUENIL) 200 MG tablet Take by mouth daily    Historical Provider, MD   aspirin 81 MG chewable tablet Take 81 mg by mouth daily    Historical Provider, MD   calcium carbonate (OSCAL) 500 MG TABS tablet Take 500 mg by mouth daily    Historical Provider, MD   potassium chloride (KLOR-CON) 20 MEQ packet Take 20 mEq by mouth 2 times daily    Historical Provider, MD       Allergies:   Haldol [haloperidol] and Sulfa antibiotics    Social History:    TOBACCO:   reports that she has never smoked. She has never used smokeless tobacco.  ETOH:   reports no history of alcohol use. E-cigarette/Vaping       Questions Responses    E-cigarette/Vaping Use Never User    Start Date     Passive Exposure No    Quit Date     Counseling Given     Comments               Family History:    Reviewed and does not pertain to current illness/condition. REVIEW OF SYSTEMS:   Pertinent positives as noted in the HPI. All other systems reviewed with patient as able and negative. Physical Exam Performed:  BP (!) 171/88   Pulse 74   Temp 98.3 °F (36.8 °C)   Resp 20   SpO2 99%   General appearance:  No apparent distress, appears stated age and cooperative. HEENT:  Diffuse conjunctival hemorrhage, chemosis and oz-orbital ecchymosis. Pupils equal, round, and reactive to light.   Neck:  Supple, no jugular venous distention. Trachea midline with full range of motion. Respiratory:  Normal respiratory effort. Diminished but clear. Cardiovascular:  Regular rate and rhythm with normal S1/S2 without murmurs, rubs or gallops. Abdomen:  Soft, non-tender, non-distended with normal bowel sounds. Musculoskelatal:  No clubbing, cyanosis or edema bilaterally. Full range of motion without deformity. Neurologic:  Neurovascularly intact without any focal sensory/motor deficits. Cranial nerves: II-XII intact, grossly non-focal.  Psychiatric:  Alert and oriented, thought content appropriate, normal insight  Skin:  Skin color, texture, turgor normal.  No rashes or lesions. Capillary Refill:  Brisk,< 3 seconds   Peripheral Pulses:  +2 palpable, equal bilaterally     Labs:     Recent Labs     12/01/22  1448 12/03/22  1114   WBC 6.0 11.1*   HGB 10.4* 11.7*   HCT 32.2* 37.0    311     Recent Labs     12/03/22  1114   *   K 4.3   CL 95*   CO2 33*   BUN 15   CREATININE 0.9   CALCIUM 8.9     Recent Labs     12/03/22  1114   AST 14*   ALT <5*   BILITOT 0.4   ALKPHOS 90     No results for input(s): INR in the last 72 hours. Recent Labs     12/03/22  1114   TROPONINI <0.01       Radiology:     CXR: I have reviewed the CXR with the following interpretation: Mild pulmonary congestion  EKG:  I have reviewed the EKG with the following interpretation: NSR, no acute ischemic changes. XR CHEST (2 VW)    Result Date: 12/3/2022  EXAMINATION: TWO XRAY VIEWS OF THE CHEST 12/3/2022 11:22 am COMPARISON: 06/25/2022 HISTORY: ORDERING SYSTEM PROVIDED HISTORY: Chest pain TECHNOLOGIST PROVIDED HISTORY: Reason for exam:->Chest pain Reason for Exam: Chest pain, SOB FINDINGS: The heart is enlarged with mild central pulmonary venous congestion. There are patchy bilateral perihilar and lower lobe airspace opacities along with a small left effusion. Mediastinal contours are stable. There is no pneumothorax. CHF with mild pulmonary edema. Consults:    IP CONSULT TO HEART FAILURE NURSE/COORDINATOR  IP CONSULT TO DIETITIAN      ASSESSMENT:    Principal Problem:    Acute on chronic diastolic CHF (congestive heart failure) (HCC)  Active Problems:    Atrial fibrillation (HCC)    COPD (chronic obstructive pulmonary disease) (HCC)    Hypertension    Severe aortic stenosis  Resolved Problems:    * No resolved hospital problems. *      PLAN:    Acute on chronic diastolic CHF (congestive heart failure): SOB, elevated BNP, pulmonary congestion on CXR; low clinical suspicion for Pneumonia. Flu and COVID negative. She does have known severe AS being medically managed, and suspect decompensated CHF. Continue IV Diuresis. Monitor I/O, weights, BMP. Consider Cardiology evaluation. She reports that she is currently leaning towards medical management for AS rather than TAVR. Paroxysmal Atrial Fibrillation: Stable. Continue Eliquis, rate control. COPD (chronic obstructive pulmonary disease): Stable. Continue bronchodilators. No need for steroids. Hypertension, benign: Controlled. Continue current medication regimen, monitor and adjust as needed. Right Eye Herpes Zoster: She reports she is under treatment from  (records unavailable in [facility]) for the past two weeks. Has had intraoccular injections which have lead to the extensive conjunctival and oz-orbital bruising. She is on a regimen of eye drops along with Valtrex (through 12/8/22). She is also using Erythromycin ointment in the contralateral eye; supposed to use BID but only takes once nightly so she can see during the day. DVT Prophylaxis: Eliquis  Diet: ADULT DIET; Regular  Code Status: DNR-CCA; she is not ready to commit to a DNI.    PT/OT Eval Status: NA  Dispo - 2-3 days     Rosangela Ruggiero MD

## 2022-12-03 NOTE — CONSULTS
[facility] Nurse  Consult Note       NAME:  Varinder Gonzáles  MEDICAL RECORD NUMBER:  [id number]  AGE: 80 y.o. GENDER: female  : 1930  TODAY'S DATE:  12/3/2022    Subjective; No I didn't fall. I had an injection in my eye so since I'm on blood thinners this is what happened. Davey Soria, that nurse at my facility did something and now my toe looks like that. Reason for WOCN Evaluation and Assessment:    wound on right toes   Varinder Gonzáles is a 80 y.o. female referred by:   [] Physician  [x] Nursing  [] Other:     Wound Identification:  Wound Type: arterial  Contributing Factors: decreased tissue oxygenation    Wound History: 80year old female with a history of COPD and CHF who presents to the ED with increased SOB x3 days. Current Wound Care Treatment:  YOUSUF    Patient Goal of Care:  [x] Wound Healing  [] Odor Control  [] Palliative Care  [] Pain Control   [] Other:         PAST MEDICAL HISTORY        Diagnosis Date    Arthritis     Atrial fibrillation (HCC)     COPD (chronic obstructive pulmonary disease) ([facility] [address]     Hypertension        PAST SURGICAL HISTORY    History reviewed. No pertinent surgical history. FAMILY HISTORY    History reviewed. No pertinent family history. SOCIAL HISTORY    Social History     Tobacco Use    Smoking status: Never    Smokeless tobacco: Never   Vaping Use    Vaping Use: Never used   Substance Use Topics    Alcohol use: No    Drug use: Never       ALLERGIES    Allergies   Allergen Reactions    Haldol [Haloperidol]     Sulfa Antibiotics Rash       MEDICATIONS    No current facility-administered medications on file prior to encounter. Current Outpatient Medications on File Prior to Encounter   Medication Sig Dispense Refill    busPIRone (BUSPAR) 7.5 MG tablet Take 7.5 mg by mouth 3 times daily      brimonidine (ALPHAGAN P) 0.15 % ophthalmic solution Place 1 drop into the right eye in the morning and 1 drop at noon and 1 drop in the evening. dorzolamide-timolol (COSOPT) 22.3-6.8 MG/ML ophthalmic solution Place 1 drop into the right eye 2 times daily      erythromycin (ROMYCIN) 5 MG/GM ophthalmic ointment Place into the left eye in the morning and at bedtime      furosemide (LASIX) 20 MG tablet Take 20 mg by mouth every other day      mirtazapine (REMERON) 15 MG tablet Take 15 mg by mouth nightly      ofloxacin (OCUFLOX) 0.3 % solution Place 1 drop into the right eye 4 times daily      omeprazole (PRILOSEC) 20 MG delayed release capsule Take 20 mg by mouth in the morning and at bedtime      potassium chloride (KLOR-CON M) 20 MEQ extended release tablet Take 20 mEq by mouth daily      valACYclovir (VALTREX) 1 g tablet Take 1,000 mg by mouth 3 times daily      ELIQUIS 5 MG TABS tablet Take 5 mg by mouth      [START ON 12/23/2022] fluticasone-umeclidin-vilant (TRELEGY ELLIPTA) 100-62.5-25 MCG/INH AEPB Inhale 1 puff into the lungs daily 60 each 5    albuterol sulfate HFA (PROVENTIL;VENTOLIN;PROAIR) 108 (90 Base) MCG/ACT inhaler Inhale 2 puffs into the lungs every 6 hours as needed      albuterol (ACCUNEB) 1.25 MG/3ML nebulizer solution Inhale 3 mLs into the lungs every 4 hours as needed for Wheezing or Shortness of Breath 360 mL 3    metoprolol tartrate (LOPRESSOR) 25 MG tablet Take 1 tablet by mouth 2 times daily 60 tablet 0    hydroxychloroquine (PLAQUENIL) 200 MG tablet Take by mouth daily      aspirin 81 MG chewable tablet Take 81 mg by mouth daily      calcium carbonate (OSCAL) 500 MG TABS tablet Take 500 mg by mouth daily         Objective; Sitting up in bed,    BP (!) 155/65   Pulse 75   Temp 98.2 °F (36.8 °C) (Oral)   Resp 17   Ht 5' 3\" (1.6 m)   Wt 152 lb 5.4 oz (69.1 kg)   SpO2 98%   BMI 26.99 kg/m²     LABS:  WBC:    Lab Results   Component Value Date/Time    WBC 11.1 12/03/2022 11:14 AM     H/H:    Lab Results   Component Value Date/Time    HGB 11.7 12/03/2022 11:14 AM    HCT 37.0 12/03/2022 11:14 AM     PTT:    Lab Results   Component Value Date/Time    APTT 29.5 04/23/2022 12:55 AM   [APTT}  PT/INR:    Lab Results   Component Value Date/Time    PROTIME 11.5 04/23/2022 12:55 AM    INR 1.02 04/23/2022 12:55 AM     HgBA1c:  No results found for: LABA1C    Assessment; Right foot 2nd toes swollen ,dry, open dry ulcer   Raghu Risk Score: Raghu Scale Score: 18    Patient Active Problem List   Diagnosis    JENS (acute kidney injury) ([facility] [address]    Atrial fibrillation (HCC)    COPD (chronic obstructive pulmonary disease) (HCC)    Hypertension    Arthritis    Hypoxia    Acute respiratory failure with hypoxia (HCC)    Contusion of left hip    Elevated d-dimer    Acute on chronic diastolic CHF (congestive heart failure) (HCC)    Severe aortic stenosis       Measurements:  Wound 12/03/22 Toe (Comment  which one) Anterior;Right wound anterior toe (Active)   Wound Image   12/03/22 1703   Wound Etiology Arterial 12/03/22 1703   Wound Cleansed Cleansed with saline 12/03/22 1703   Dressing/Treatment Betadine swabs/povidone iodine;Open to air 12/03/22 1703   Offloading for Diabetic Foot Ulcers Offloading ordered 12/03/22 1703   Dressing Change Due 12/04/22 12/03/22 1703   Wound Length (cm) 0.6 cm 12/03/22 1703   Wound Width (cm) 0.5 cm 12/03/22 1703   Wound Depth (cm) 0.1 cm 12/03/22 1703   Wound Surface Area (cm^2) 0.3 cm^2 12/03/22 1703   Wound Volume (cm^3) 0.03 cm^3 12/03/22 1703   Tunneling Position ___ O'Clock 0 12/03/22 1703   Undermining Starts ___ O'Clock 0 12/03/22 1703   Undermining Ends___ O'Clock 0 12/03/22 1703   Undermining Maxium Distance (cm) 0 12/03/22 1703   Wound Assessment Dry 12/03/22 1703   Drainage Amount None 12/03/22 1703   Odor None 12/03/22 1703   Tia-wound Assessment Dry/flaky; Warm 12/03/22 1703   Margins Attached edges 12/03/22 1703   Wound Thickness Description not for Pressure Injury Partial thickness 12/03/22 1703   Number of days: 0              Response to treatment:  Well tolerated by patient.      Pain Assessment:  Severity:  0 / 10  Quality of pain: N/A  Wound Pain Timing/Severity: none  Premedicated: No    Plan   Plan of Care: Wound 12/03/22 Toe (Comment  which one) Anterior;Right wound anterior toe-Dressing/Treatment: Betadine swabs/povidone iodine, Open to air    Requested bedside nurse to place consult for Podiatry related to swelling and ulcer on toe. Recommend;  Daily cleanse 2nd toe of right foot with normal saline, pat dry. Apply Betadine swab to ulcer until Podiatry evaluates. Can leave open to air. Specialty Bed Required : Yes   [x] Low Air Loss   [x] Pressure Redistribution  [] Fluid Immersion  [] Bariatric  [] Total Pressure Relief  [] Other:     Current Diet: ADULT DIET; Regular  Dietician consult:  Yes    Discharge Plan:  Placement for patient upon discharge: skilled nursing    Patient appropriate for Outpatient [address]: No    Referrals:  [x]  / discharge planner  following  [] [facility]  [] Supplies  [] Other    Patient/Caregiver Teaching:Patient wears support stockings up to upper thighs. Toes of stockings open.   Level of patient/caregiver understanding able to: Keep feet elevated  [] Indicates understanding       [] Needs reinforcement  [] Unsuccessful      [x] Verbal Understanding  [] Demonstrated understanding       [] No evidence of learning  [] Refused teaching         [] N/A       Electronically signed by Darylene Gambles, RN, BSN on 12/3/2022 at 5:06 PM

## 2022-12-03 NOTE — RT PROTOCOL NOTE
RT Nebulizer Bronchodilator Protocol Note    There is a bronchodilator order in the chart from a provider indicating to follow the RT Bronchodilator Protocol and there is an Initiate RT Bronchodilator Protocol order as well (see protocol at bottom of note). CXR Findings:  No results found. The findings from the last RT Protocol Assessment were as follows:  Smoking: Chronic pulmonary disease  Respiratory Pattern: Regular pattern and RR 12-20 bpm  Breath Sounds: Intermittent or unilateral wheezes  Cough: Strong, spontaneous, non-productive  Indication for Bronchodilator Therapy: On home bronchodilators, Wheezing associated with pulm disorder  Bronchodilator Assessment Score: 6    Aerosolized bronchodilator medication orders have been revised according to the RT Nebulizer Bronchodilator Protocol below. Respiratory Therapist to perform RT Therapy Protocol Assessment initially then follow the protocol. Repeat RT Therapy Protocol Assessment PRN for score 0-3 or on second treatment, BID, and PRN for scores above 3. No Indications - adjust the frequency to every 6 hours PRN wheezing or bronchospasm, if no treatments needed after 48 hours then discontinue using Per Protocol order mode. If indication present, adjust the RT bronchodilator orders based on the Bronchodilator Assessment Score as indicated below. If a patient is on this medication at home then do not decrease Frequency below that used at home. 0-3 - enter or revise RT bronchodilator order(s) to equivalent RT Bronchodilator order with Frequency of every 4 hours PRN for wheezing or increased work of breathing using Per Protocol order mode. 4-6 - enter or revise RT Bronchodilator order(s) to two equivalent RT bronchodilator orders with one order with BID Frequency and one order with Frequency of every 4 hours PRN wheezing or increased work of breathing using Per Protocol order mode.          7-10 - enter or revise RT Bronchodilator order(s) to two equivalent RT bronchodilator orders with one order with TID Frequency and one order with Frequency of every 4 hours PRN wheezing or increased work of breathing using Per Protocol order mode. 11-13 - enter or revise RT Bronchodilator order(s) to one equivalent RT bronchodilator order with QID Frequency and an Albuterol order with Frequency of every 4 hours PRN wheezing or increased work of breathing using Per Protocol order mode. Greater than 13 - enter or revise RT Bronchodilator order(s) to one equivalent RT bronchodilator order with every 4 hours Frequency and an Albuterol order with Frequency of every 2 hours PRN wheezing or increased work of breathing using Per Protocol order mode. RT to enter RT Home Evaluation for COPD & MDI Assessment order using Per Protocol order mode.     Electronically signed by Parth Espana RCP on 12/3/2022 at 5:12 PM

## 2022-12-04 NOTE — CONSULTS
[id number]    SOB  AS - severe  A/C CHF  PAF  HTN  COPD    Increase diuresis  Still contemplating TAVR

## 2022-12-04 NOTE — CONSULTS
Consult placed    [phone] [facility] cardiology  Date:12/4/2022,  Time:10:55 AM        Electronically signed by Christi Benson RN on 12/4/2022 at 10:55 AM

## 2022-12-04 NOTE — CONSULTS
Consult placed    Who:[facility]  Date:12/4/2022,  Time:10:58 AM        Electronically signed by Riki Gallegos RN on 12/4/2022 at 10:58 AM

## 2022-12-04 NOTE — PROGRESS NOTES
Speech Language Pathology  Clinical Bedside Swallow Assessment  Facility/Department: [facility] A2 CARD TELEMETRY      Instrumentation: Not clinically indicated at this time. Will continue to monitor  Diet recommendation: IDDSI 7 Regular Solids; IDDSI 0 Thin Liquids; Meds whole in puree (cut big ones in half- pt preference)  Risk management: upright for all intake, stay upright for at least 30 mins after intake, small bites/sips, oral care 2-3x/day to reduce adverse affects in the event of aspiration, increase physical mobility as able, alternate bites/sips, slow rate of intake, general aspiration precautions, and hold PO and contact SLP if s/s of aspiration or worsening respiratory status develop.     Ele Lucas  : 1930 (80 y.o.)   MRN: [id number]  ROOM:   ADMISSION DATE: 12/3/2022  PATIENT DIAGNOSIS(ES): Acute pulmonary edema (HCC) [J81.0]  Acute on chronic diastolic CHF (congestive heart failure) ([facility]) [I50.33]  Dyspnea, unspecified type [R06.00]  Congestive heart failure, unspecified HF chronicity, unspecified heart failure type ([address] [I50.9]  Chief Complaint   Patient presents with    Shortness of Breath     From provision, called for SOB, baseline wears 3L NC, Pt took oxygen off to brush teeth and went down to 82%,      Patient Active Problem List    Diagnosis Date Noted    Acute on chronic diastolic CHF (congestive heart failure) ([address] 2022    Severe aortic stenosis 2022    JENS (acute kidney injury) ([address] 2022    Atrial fibrillation ([address] 2022    COPD (chronic obstructive pulmonary disease) ([address] 2022    Hypertension 2022    Arthritis 2022    Hypoxia 2022    Acute respiratory failure with hypoxia ([address] 2022    Contusion of left hip     Elevated d-dimer      Past Medical History:   Diagnosis Date    Arthritis     Atrial fibrillation (HCC)     COPD (chronic obstructive pulmonary disease) ([address]     Hypertension      History reviewed. No pertinent surgical history. Allergies   Allergen Reactions    Haldol [Haloperidol]     Sulfa Antibiotics Rash       DATE ONSET: 12/03/2022    Date of Evaluation: 12/4/2022   Evaluating Therapist: KAROL Jarquin    Chart Reviewed: : [x] Yes [] No    Current Diet: ADULT DIET; Regular    Recent Chest Radiography: [x] Chest XR   [] CT of Chest  Date: 12/03/2022  Impressions  Impression   CHF with mild pulmonary edema. Pain: The patient does not complain of pain     Reason for Referral  Harshad Barrett was referred for a bedside swallow evaluation to assess the efficiency of their swallow function, identify signs and symptoms of aspiration and make recommendations regarding safe dietary consistencies, effective compensatory strategies, and safe eating environment. Assessment    Medical record review/interview: Per MD H&P: \"80 y.o. female who [facility] to [facility] with SOB. PMHx significant for COPD, Chronic Diastolic CHF, Severe AS, Paroxysmal Atrial Fibrillation and HTN. She is followed by Pulmonology Dr. Kimi Gilmore. She recently was evaluated by Cardiology for possible TAVR but she requested more time to discuss with her family. She presents with 2-3 days of worsening SOB, not associated with any chest pain, dizziness, N/V. She denies any significant LE edema. 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 She reports treatment was at [address] [place] [address] but no records available. She reports she has had intra-occular injections which resulted in diffuse conjunctival hemorrhage, chemosis and oz-orbital ecchymosis.  She was also treated with 15 day course of Valtrex (through 12/8/22), with multiple eyedrops to right eye and erythromycin ointment to contralateral eye\"    Predisposing dysphagia risk factors: COPD and Age  Clinical signs of possible chronic dysphagia: reduced PO intake  Precipitating dysphagia risk factors: reduced physical mobility and increased O2 demands    Patient Complaints:  Odynophagia: [] Yes [x] No  Globus Sensation: [] Yes [] No- \"sometimes with big pills\"   SOB with PO intake: [] Yes [x] No  Increased WOB with PO intake: [x] Yes [] No  Reflux Sx's: [x] Yes [] No  Weight loss: [] Yes [x] No  Coughing/Choking with PO intake: [] Yes [x] No  Reduced Appetite: [x] Yes [] No    Additional Reported Symptoms/Complaints/Hospital Course:   No prior SLP intervention on record. Vitals/labs:   SpO2: 100%   RR: 18  BP: 118/66  HR: 68   O2 device: 2L NC    CBC:   Recent Labs     12/03/22  1114   WBC 11.1*   HGB 11.7*         BMP:  Recent Labs     12/04/22  0457      K 3.9   CL 97*   CO2 37*   BUN 17   CREATININE 0.8   GLUCOSE 110*          Cranial nerve exam:   CN V (trigeminal): ophthalmic, maxillary, and mandibular facial sensation- WNL b/l  CN VII (facial): WNL b/l  CN IX/X (glossopharyngeal/vagus): MPT: DNT; pitch range: DNT; vocal quality: Adequate; cough: Weak- perceptually, Non-Productive, and Dry  CN XII (hypoglossal): WNL b/l    Laryngeal function exam:   Secretions: oral mucosa pink and moist   Vocal quality: See CN exam above  MPT: See CN exam above  S/Z ratio: DNT   Pitch range: See CN exam above  Cough: See CN exam above    Oral Care Status:    [x] Oral Care [facility]  [] Poor oral care status  [] Edentulous  [] Upper Dentures  [] Lower Dentures  [] Missing/Broken Teeth  [] Evidence of dental cavities/carries    PO trials:   IDDSI 0 (thin): via straw and cup: WFL     IDDSI 4 (puree): WFL     IDDSI 7 (regular): WFL     3 oz water: PASS    Impressions:  No clinical signs of oropharyngeal dysphagia. Swallow prognosis is good. Instrumental swallow study is not indicated. Given tolerance to PO at bedside and lack of clinical s/s of aspiration at bedside, pt is safe for oral diet at this time    Instrumentation: Not clinically indicated at this time.  Will continue to monitor  Diet recommendation: IDDSI 7 Regular Solids; IDDSI 0 Thin Liquids; Meds whole in puree (cut big ones in half- pt preference)  Risk management: upright for all intake, stay upright for at least 30 mins after intake, small bites/sips, oral care 2-3x/day to reduce adverse affects in the event of aspiration, increase physical mobility as able, alternate bites/sips, slow rate of intake, general aspiration precautions, and hold PO and contact SLP if s/s of aspiration or worsening respiratory status develop.     Prognosis: Good    Recommended Intervention:   [x] Dysphagia tx  [] Videostroboscopy                      [] NPO   [] MBS       [] Speech/Cog Eval  [] Therapeutic PO Trials     [] Ice Chips   [] Other:  [] FEES                                                 Dysphagia Therapeutic Intervention:   []  Bolus control Exercises  []  Oral Motor Exercises  []  [employer] Protocol  []  Thermal Stimulation  []  Oral Care     []  Vital Stim/NMES  []  Laryngeal Exercises  [x]  Patient/Family Education  []  Pharyngeal Exercises  []  Therapeutic PO trials with SLP  [x]  Diet tolerance monitoring  []  Other:     Referrals:  [] ENT    [] PT  [] Pulmonology [] GI  [] Neurology  [] RD  [] OT   []     Goals:  Short Term Goals:  Timeframe for Short Term Goals: (5 days 12/09/2022)  Goal 1: The patient will tolerate recommended diet with no clinical s/s of aspiration 5/5  Goal 2: The patient/caregiver will demonstrate understanding of compensatory swallow strategies, for improved swallow safety    Long Term Goals:   Timeframe for Long Term Goals: (7 days 12/11/2022)  Goal 1: The patient will tolerate least restrictive diet with no clinical s/s of aspiration or worsening respiratory/pulmonary status    Treatment:  Skilled instruction completed with patient re: evidenced based practice regarding recommendations and POC, importance of oral care to reduce adverse affects in the event of aspiration, and instruction of recommended compensatory strategies developed based upon clinical exam. Pt able to recall/demonstrate compensatory strategies with min cues. Pt Education: SLP educated the patient re: Role of SLP, rationale for completion of assessment, anatomical components of swallow structures as they pertain to airway protection, results of assessment, recommendations, and POC  Pt Education Response: verbalized understanding and RN aware    Duration/Frequency of Tx: 1-2 f/u    Individuals Consulted:   [x]  Patient     []  NP         [x]  RN   []  RD                   []  MD      []  Family Member                        []  PA    []  Other:      Safety Devices / Report:  [x]  All fall risk precautions in place [x]  Safety handoff completed with RN  [x]  Bed alarm in place  [x]  Left in bed     []  Chair alarm in place  []  Left in chair   [x]  Call light in reach   []  Other:                     Total Treatment Time / Charges       Time in Time out Total Time / units   Swallow Eval/Tx Time 1135 1148 13 min/ 1 unit      Signature:  Aleksandra WALKER-SLP  Clinical Fellow  [id number]

## 2022-12-04 NOTE — PROGRESS NOTES
Patient spitting up after dinner. Zofran given. Speech may want to come back and evaluate patient. She was sitting straight up in bed for dinner. Patient takes small bites. Will continue to monitor.

## 2022-12-04 NOTE — PROGRESS NOTES
Patient's EF (Ejection Fraction) is greater than 40%    Heart Failure Medications:  Diuretics[de-identified] None    (One of the following REQUIRED for EF </= 40%/SYSTOLIC FAILURE but MAY be used in EF% >40%/DIASTOLIC FAILURE)        ACE[de-identified] None        ARB[de-identified] None         ARNI[de-identified] None    (Beta Blockers)  NON- Evidenced Based Beta Blocker (for EF% >40%/DIASTOLIC FAILURE): None    Evidenced Based Beta Blocker::(REQUIRED for EF% <40%/SYSTOLIC FAILURE) None  . .................................................................................................................................................. Patient's weights and intake/output reviewed: Yes    Patient's Last Weight: 152 lbs obtained by standing scale. Difference  than last documented weight. No intake or output data in the 24 hours ending 12/03/22 8649    Education Booklet Provided: yes    Comorbidities Reviewed Yes    Patient has a past medical history of Arthritis, Atrial fibrillation ([facility] [address] COPD (chronic obstructive pulmonary disease) ([facility] [address] and Hypertension. >>For CHF and Comorbidity documentation on Education Time and Topics, please see Education Tab    Progressive Mobility Assessment:  What is this patient's Current Level of Mobility?: Ambulatory- with Assistance  How was this patient Mobilized today?: Edge of Bed, ambulated 0 ft                 With Whom? Nurse and PCA                 Level of Difficulty/Assistance: 1x Assist     Pt resting in bed at this time on  3 L O2. Pt denies shortness of breath. Pt without lower extremity edema.      Patient and/or Family's stated Goal of Care this Admission: reduce shortness of breath, increase activity tolerance, better understand heart failure and disease management, be more comfortable, and reduce lower extremity edema prior to discharge        :

## 2022-12-04 NOTE — PROGRESS NOTES
Hospitalist Progress Note    Date of Admission: 12/3/2022    Chief Complaint: SOB    Hospital Course: 80 y.o. female who presented to [facility] with SOB. PMHx significant for COPD, Chronic Diastolic CHF, Severe AS, Paroxysmal Atrial Fibrillation and HTN. She is followed by Pulmonology Dr. Diane Bose. She recently was evaluated by Cardiology for possible TAVR but she requested more time to discuss with her family. She presents with 2-3 days of worsening SOB, not associated with any chest pain, dizziness, N/V. She denies any significant LE edema. 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 She reports treatment was at [facility] but no records available. She reports she has had intra-occular injections which resulted in diffuse conjunctival hemorrhage, chemosis and oz-orbital ecchymosis. She was also treated with 15 day course of Valtrex (through 12/8/22), with multiple eyedrops to right eye and erythromycin ointment to contralateral eye. Subjective: Ongoing mild SOB and LE edema. Labs:   Recent Labs     12/03/22  1114   WBC 11.1*   HGB 11.7*   HCT 37.0        Recent Labs     12/03/22  1114 12/04/22  0457   * 139   K 4.3 3.9   CL 95* 97*   CO2 33* 37*   BUN 15 17   CREATININE 0.9 0.8   CALCIUM 8.9 8.6     Recent Labs     12/03/22  1114   AST 14*   ALT <5*   BILITOT 0.4   ALKPHOS 90     No results for input(s): INR in the last 72 hours. Physical Exam Performed:    BP (!) 141/70   Pulse 69   Temp 97.6 °F (36.4 °C) (Oral)   Resp 18   Ht 5' 3\" (1.6 m)   Wt 152 lb 5.4 oz (69.1 kg)   SpO2 97%   BMI 26.99 kg/m²     General appearance:  No apparent distress, appears stated age and cooperative. HEENT:  Diffuse conjunctival hemorrhage, chemosis and oz-orbital ecchymosis. Pupils equal, round, and reactive to light. Neck:  Supple, no jugular venous distention. Trachea midline with full range of motion.   Respiratory:  Normal respiratory effort. Diminished but clear. Cardiovascular:  Regular rate and rhythm with normal S1/S2 without murmurs, rubs or gallops. Abdomen:  Soft, non-tender, non-distended with normal bowel sounds. Musculoskelatal:  No clubbing, cyanosis. Mild LE edema bilaterally. Full range of motion without deformity. Neurologic:  Neurovascularly intact without any focal sensory/motor deficits. Cranial nerves: II-XII intact, grossly non-focal.  Psychiatric:  Alert and oriented, thought content appropriate, normal insight  Skin:  Skin color, texture, turgor normal.  No rashes or lesions. Capillary Refill:  Brisk,< 3 seconds   Peripheral Pulses:  +2 palpable, equal bilaterally       Consults:    IP CONSULT TO HEART FAILURE NURSE/COORDINATOR  IP CONSULT TO DIETITIAN  IP CONSULT TO PODIATRY  IP CONSULT TO CARDIOLOGY    Assessment/Plan:    Active Hospital Problems    Diagnosis     Acute on chronic diastolic CHF (congestive heart failure) ([facility]) [I50.33]      Priority: Medium    Severe aortic stenosis [I35.0]      Priority: Medium    Atrial fibrillation ([facility]) [I48.91]      Priority: Medium    COPD (chronic obstructive pulmonary disease) ([facility]) [J44.9]      Priority: Medium    Hypertension [I10]      Priority: Medium     Acute on chronic diastolic CHF (congestive heart failure): SOB, elevated BNP, pulmonary congestion on CXR; low clinical suspicion for Pneumonia. Flu and COVID negative. She does have known severe AS being medically managed, and suspect decompensated CHF. Continue IV Diuresis. Monitor I/O, weights, BMP. Cardiology evaluation. She reports that she is currently leaning towards medical management for AS rather than TAVR. Paroxysmal Atrial Fibrillation: Stable. Continue Eliquis, rate control. COPD (chronic obstructive pulmonary disease): Stable. Continue bronchodilators. No need for steroids. Hypertension, benign: Controlled. Continue current medication regimen, monitor and adjust as needed.      Right Eye Herpes Zoster: She reports she is under treatment from [facility] (records unavailable in [facility]) for the past two weeks. Has had intraoccular injections which have lead to the extensive conjunctival and oz-orbital bruising. She is on a regimen of eye drops along with Valtrex (through 12/8/22). She is also using Erythromycin ointment in the contralateral eye; supposed to use BID but only takes once nightly so she can see during the day. DVT Prophylaxis: Eliquis  Code Status: DNR-CCA; she is not ready to commit to a DNI. Diet: ADULT DIET;  Regular  PT/OT Eval Status:  NA    Dispo - ?2-3 days     Appropriate for A1 Discharge Unit: Aliyah Sawant MD

## 2022-12-04 NOTE — CONSULTS
Department of Orthopedic Surgery  Dr. Annamarie Gill  Consult Note        Reason for Consult:  toe ulcer       CHIEF COMPLAINT:  shortness  of breath     History Obtained From:  patient, electronic medical record    HISTORY OF PRESENT ILLNESS:                The patient is a 80 y.o. female who presented to ED with shortness of breath. Patient has Hx of COPD, A- fib, arthritis and HTN and recent Oxular Herpes Zoster. She had worsening of SOB for 2-3 days. Patient has had ulcer on right 2nd toe for about 1 year. Patient states ulcer started from wearing shoes which rubbed and she still has been wearing them but getting new shoes from a podiatrist ( unknown name) which came into [address] [place] [address]. Patient also had wound nurse changing dressing multiple times per week and using metahoney and it has been improving since they have been using. Patient states the podiatrist at facility nicked her toe when trimming nails and why there is dried blood on great toe. Past Medical History:        Diagnosis Date    Arthritis     Atrial fibrillation ([facility] [address]     COPD (chronic obstructive pulmonary disease) ([facility] [address]     Hypertension      Past Surgical History:    History reviewed. No pertinent surgical history.   Current Medications:   Current Facility-Administered Medications: albuterol (PROVENTIL) nebulizer solution 1.25 mg, 1.25 mg, Nebulization, Q4H PRN  aspirin chewable tablet 81 mg, 81 mg, Oral, Daily  calcium elemental (OSCAL) tablet 500 mg, 500 mg, Oral, Daily  apixaban (ELIQUIS) tablet 5 mg, 5 mg, Oral, BID  hydroxychloroquine (PLAQUENIL) tablet 200 mg, 200 mg, Oral, Daily  metoprolol tartrate (LOPRESSOR) tablet 25 mg, 25 mg, Oral, BID  sodium chloride flush 0.9 % injection 5-40 mL, 5-40 mL, IntraVENous, 2 times per day  sodium chloride flush 0.9 % injection 5-40 mL, 5-40 mL, IntraVENous, PRN  0.9 % sodium chloride infusion, , IntraVENous, PRN  ondansetron (ZOFRAN-ODT) disintegrating tablet 4 mg, 4 mg, Oral, Q8H PRN **OR** ondansetron (ZOFRAN) injection 4 mg, 4 mg, IntraVENous, Q6H PRN  polyethylene glycol (GLYCOLAX) packet 17 g, 17 g, Oral, Daily PRN  acetaminophen (TYLENOL) tablet 650 mg, 650 mg, Oral, Q6H PRN **OR** acetaminophen (TYLENOL) suppository 650 mg, 650 mg, Rectal, Q6H PRN  furosemide (LASIX) injection 40 mg, 40 mg, IntraVENous, Daily  brimonidine (ALPHAGAN) 0.2 % ophthalmic solution 1 drop, 1 drop, Right Eye, TID  busPIRone (BUSPAR) tablet 7.5 mg, 7.5 mg, Oral, TID  dorzolamide-timolol (COSOPT) 22.3-6.8 MG/ML ophthalmic solution 1 drop, 1 drop, Right Eye, BID  erythromycin (ROMYCIN) ophthalmic ointment, , Left Eye, BID  mirtazapine (REMERON) tablet 15 mg, 15 mg, Oral, Nightly  pantoprazole (PROTONIX) tablet 40 mg, 40 mg, Oral, BID AC  valACYclovir (VALTREX) tablet 1,000 mg, 1,000 mg, Oral, BID  tiotropium (SPIRIVA RESPIMAT) 2.5 MCG/ACT inhaler 2 puff, 2 puff, Inhalation, Daily  mometasone-formoterol (DULERA) 200-5 MCG/ACT inhaler 2 puff, 2 puff, Inhalation, BID  ciprofloxacin (CILOXAN) 0.3 % ophthalmic solution 1 drop, 1 drop, Right Eye, Q4H While awake  albuterol sulfate HFA (PROVENTIL;VENTOLIN;PROAIR) 108 (90 Base) MCG/ACT inhaler 2 puff, 2 puff, Inhalation, BID  prednisoLONE acetate (PRED FORTE) 1 % ophthalmic suspension 1 drop, 1 drop, Right Eye, 4x Daily  Allergies:  Haldol [haloperidol] and Sulfa antibiotics    Social History:   TOBACCO:   reports that she has never smoked. She has never used smokeless tobacco.  ETOH:   reports no history of alcohol use. DRUGS:   reports no history of drug use. Family History:   History reviewed. No pertinent family history. REVIEW OF SYSTEMS:   Pertinent positives as noted in the HPI.  All other systems reviewed with patient as able and negative    PHYSICAL EXAM:    VITALS:  /83   Pulse 68   Temp 97.7 °F (36.5 °C) (Oral)   Resp 18   Ht 5' 3\" (1.6 m)   Wt 152 lb 5.4 oz (69.1 kg)   SpO2 94%   BMI 26.99 kg/m²   CONSTITUTIONAL:  awake, alert, cooperative, no apparent distress, and appears stated age  EYES:  right eye with conjuctival hemorrhage and periorbital ecchymosis  ENT:  Normocephalic, without obvious abnormality, + dental implants   NECK:  Supple, symmetrical, trachea midline,   ABDOMEN:  No scars, normal bowel sounds, soft, non-distended, non-tender, no masses palpated, no hepatosplenomegally  LOWER EXTREMITY: VASCULAR: Dorsalis pedis and posterior tibial pulses are palpable bilateral. + 1 pitting edema noted to bilateral lower legs. NEURO: No clonus. Intact sensation  DERM: No signs of infection. Ulcer full-thickness to SQ tissue is noted dorsal PIPJ of right 2nd toe. Ulcer does not probe to bone or track. Hyperkeratotic tissue noted lateral 5th toe PIPJ with no ulcerations noted. Dried sanginous drainage noted right hallux nail bed with no signs of infection. Hallux nail is fungal.    MS: Contracture is noted to right 2nd toe DIPJ, PIPJ with hammertoe type deformity.      DATA:    CBC with Differential:    Lab Results   Component Value Date/Time    WBC 11.1 12/03/2022 11:14 AM    RBC 4.08 12/03/2022 11:14 AM    HGB 11.7 12/03/2022 11:14 AM    HCT 37.0 12/03/2022 11:14 AM     12/03/2022 11:14 AM    MCV 90.8 12/03/2022 11:14 AM    MCH 28.6 12/03/2022 11:14 AM    MCHC 31.5 12/03/2022 11:14 AM    RDW 14.5 12/03/2022 11:14 AM    LYMPHOPCT 8.2 12/03/2022 11:14 AM    MONOPCT 8.4 12/03/2022 11:14 AM    BASOPCT 0.3 12/03/2022 11:14 AM    MONOSABS 0.9 12/03/2022 11:14 AM    LYMPHSABS 0.9 12/03/2022 11:14 AM    EOSABS 0.0 12/03/2022 11:14 AM    BASOSABS 0.0 12/03/2022 11:14 AM     CMP:    Lab Results   Component Value Date/Time     12/04/2022 04:57 AM    K 3.9 12/04/2022 04:57 AM    K 5.2 06/25/2022 01:31 AM    CL 97 12/04/2022 04:57 AM    CO2 37 12/04/2022 04:57 AM    BUN 17 12/04/2022 04:57 AM    CREATININE 0.8 12/04/2022 04:57 AM    GFRAA 51 06/25/2022 01:31 AM    AGRATIO 1.1 12/03/2022 11:14 AM    LABGLOM >60 12/04/2022 04:57 AM    GLUCOSE 110 12/04/2022 04:57 AM    PROT 7.2 12/03/2022 11:14 AM    LABALBU 3.8 12/03/2022 11:14 AM    CALCIUM 8.6 12/04/2022 04:57 AM    BILITOT 0.4 12/03/2022 11:14 AM    ALKPHOS 90 12/03/2022 11:14 AM    AST 14 12/03/2022 11:14 AM    ALT <5 12/03/2022 11:14 AM     Blood cultures: in process      IMPRESSION/RECOMMENDATIONS: This is a 80year old female with:   Right 2nd toe fullthickness to SQ tissue ulceration   Reviewed dx and treatments. Verbal consent for debridement of ulcer. Cleansed wound with alcohol. Using 15 blade scalpel sharp excisional debridement fullthickness to SQ tissue was completed to remove any non viable tissue and promote healing. Dressing - cleanse with saline. Pat dry. Apply Keracel AG and dsd to toe daily. Once return to nursing facility - continue with wound care already ordered ( metahoney and dsd)   Explained her ulcer is due to the shoe pressure and do recommend new sponge top shoe with deeper toe box or surgical shoe to completely offload until healed   Hammer toe right 2nd toe   Due to deformity and edema - need deeper toe box of shoe to prevent ulceration in future  Lower extremity edema   Compression hose - knee high open toe 20-30mmhg recommended. Per patient she has at nursing facility. Hx of nail bed injury- likely from previous nail trimming - with no signs of infection   Dry dressing if any signs of infection to call office   5. Acute on chronic CHF - cardiology following.

## 2022-12-04 NOTE — PLAN OF CARE
SLP completed evaluation. Please refer to notes in EMR.     Oj WALKER-SLP  Clinical Fellow  [id number]

## 2022-12-04 NOTE — DISCHARGE INSTRUCTIONS
Cleanse 2nd toe right with saline. Pat dry. Apply medihoney and DSD to 2nd toe daily.    Wound care coming to her facility - continue to see her 2 times weekly  Recommend surgical shoe vs deeper toe box of shoe with sponge top

## 2022-12-05 NOTE — PROGRESS NOTES
Patient's EF (Ejection Fraction) is greater than 40%    Heart Failure Medications:  Diuretics[de-identified] Furosemide    (One of the following REQUIRED for EF </= 40%/SYSTOLIC FAILURE but MAY be used in EF% >40%/DIASTOLIC FAILURE)        ACE[de-identified] None        ARB[de-identified] None         ARNI[de-identified] None    (Beta Blockers)  NON- Evidenced Based Beta Blocker (for EF% >40%/DIASTOLIC FAILURE): Metoprolol TARTrate- Lopressor    Evidenced Based Beta Blocker::(REQUIRED for EF% <40%/SYSTOLIC FAILURE) None  . .................................................................................................................................................. Patient's weights and intake/output reviewed: Yes    Patient's Last Weight: 152 lbs obtained by standing scale. Difference of  lbs  than last documented weight. Intake/Output Summary (Last 24 hours) at 12/5/2022 0132  Last data filed at 12/5/2022 0124  Gross per 24 hour   Intake --   Output 2150 ml   Net -2150 ml       Education Booklet Provided: yes    Comorbidities Reviewed Yes    Patient has a past medical history of Arthritis, Atrial fibrillation ([address] COPD (chronic obstructive pulmonary disease) ([facility] [address] and Hypertension. >>For CHF and Comorbidity documentation on Education Time and Topics, please see Education Tab    Progressive Mobility Assessment:  What is this patient's Current Level of Mobility?: Ambulatory- with Assistance  How was this patient Mobilized today?: Up to Chair, ambulated 6 ft                 With Whom? Nurse and PCA                 Level of Difficulty/Assistance: 1x Assist     Pt resting in bed at this time on  2 L O2. Pt denies shortness of breath. Pt with nonpitting lower extremity edema.      Patient and/or Family's stated Goal of Care this Admission: reduce shortness of breath, increase activity tolerance, better understand heart failure and disease management, be more comfortable, and reduce lower extremity edema prior to discharge        :

## 2022-12-05 NOTE — CONSULTS
[address] [facility]                                  CONSULTATION    PATIENT NAME: Cintia Orosco                   :        1930  MED REC NO:   [id number]                          ROOM:       0945  ACCOUNT NO:   [de-identified]                           ADMIT DATE: 2022  PROVIDER:     Abigail Gregg MD    CONSULT DATE:  2022    REASON FOR CONSULTATION:  Congestive heart failure. HISTORY OF PRESENT ILLNESS:  A 66-year-old female presented to the  hospital with complaints of shortness of breath, she has chronic  shortness of breath, it got worse over the last 2 to 3 days. Shortness  of breath worse with activity, but also at rest.  No associated chest  pain or edema. It is severe, little improved since here in the  hospital.    PAST MEDICAL HISTORY:  1. Aortic stenosis severe. She was seen in the TAVR Clinic and at that  time she started that she wanted to think more about the procedure and  discuss with her family. She has not followed up since then. 2.  Chronic systolic congestive heart failure, most likely due to aortic  stenosis. 3.  Paroxysmal atrial fibrillation. 4.  Chronic anticoagulation. 5.  Hypertension. 6.  COPD.  7.  Ocular herpes, current infection. 8.  Arthritis. SOCIAL HISTORY:  She does not smoke. FAMILY HISTORY:  Positive for heart disease. REVIEW OF SYSTEMS:  No fevers or chills. No cough. No focal neurologic  symptoms. No headache or visual changes. No recent GI or  bleeding. No recent or upcoming surgeries. No syncope, no palpitations. She has  pain in the right eye. All other systems are negative except as present  illness. ALLERGIES:  See list in the chart, which I reviewed. MEDICATIONS:  See list in the chart, which I reviewed.     PHYSICAL EXAMINATION:  VITALS:  Blood pressure is 115/83, heart rate 68, respirations 18,  temperature 97.7, she is 94% saturated on oxygen. GENERAL:  A well-developed, well-nourished white female, in no acute  distress. HEENT:  Normocephalic and atraumatic. Oropharynx clear. Moist mucous  membranes. She has ecchymosis around her right eye from injections for  the herpes. NECK:  Supple. CHEST:  Clear. CARDIAC:  Regular S1 and S2 with murmur. Jugular venous pressure is  elevated. Carotid pulses are diminished. ABDOMEN:  Soft and nontender. Positive bowel sounds. EXTREMITIES:  No cyanosis or edema. NEUROLOGIC:  Grossly nonfocal.  SKIN:  Warm and dry. PSYCHIATRIC:  Affect calm. VASCULAR:  Capillary refill normal.  Peripheral pulses intact. DIAGNOSTIC DATA:  Telemetry, sinus rhythm. Chest x-ray, no pulmonary  edema. A proBNP 4642, creatinine 0.8. Hemoglobin 11.7. Influenza and  COVID not detected. Echocardiogram reviewed. Prior records reviewed. IMPRESSION:  1. Shortness of breath due to congestive heart failure and aortic  stenosis. 2.  Aortic stenosis - severe symptomatic. 3.  Acute on chronic valvular congestive heart failure. 4.  Paroxysmal atrial fibrillation stable in sinus rhythm. 5.  Chronic anticoagulation. 6.  Hypertension. 7.  COPD. RECOMMENDATIONS:  1. Increase diurese with IV Lasix. 2.  I again discussed with the patient severe aortic stenosis treatment  options including TAVR. I discussed the prognosis and potential  complications. She tells me she is still uncertain if she wants to go thorough the procedure. She wants  to continue to consider. We will continue to have conversation with her  in regards to this. CHAKA Bonilla MD    D: 12/04/2022 14:25:21       T: 12/04/2022 16:55:19     [email or web address]  Job#: [id number]     Doc#: [id number]    CC:

## 2022-12-07 LAB
BLOOD CULTURE, ROUTINE: NORMAL
CULTURE, BLOOD 2: NORMAL

## 2022-12-29 NOTE — DISCHARGE SUMMARY
Hospital Medicine Discharge Summary    Patient ID: Nenita Davis      Patient's PCP: Michelle Hidalgo    Admitting Physician: Daxa De La Cruz MD  Discharge Physician: Daxa De La Cruz MD     Admit Date: 12/3/2022     Disposition:     Discharge Diagnoses: Active Hospital Problems    Diagnosis     Acute on chronic diastolic CHF (congestive heart failure) (HCC) [I50.33]      Priority: Medium    Severe aortic stenosis [I35.0]      Priority: Medium    Atrial fibrillation (HCC) [I48.91]      Priority: Medium    COPD (chronic obstructive pulmonary disease) ([address] [J44.9]      Priority: Medium    Hypertension [I10]      Priority: Medium       Date of Death: 22  Time of Death: [address] Course: 80 y.o. female who presented to [facility] with SOB. PMHx significant for COPD, Chronic Diastolic CHF, Severe AS, Paroxysmal Atrial Fibrillation and HTN. She is followed by Pulmonology Dr. Noa Melvin. She recently was evaluated by Cardiology for possible TAVR but she requested more time to discuss with her family. She presents with 2-3 days of worsening SOB, not associated with any chest pain, dizziness, N/V. She denies any significant LE edema. 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 She reports treatment was at [facility] but no records available. She reports she has had intra-occular injections which resulted in diffuse conjunctival hemorrhage, chemosis and oz-orbital ecchymosis. She was also treated with 15 day course of Valtrex (through 22), with multiple eyedrops to right eye and erythromycin ointment to contralateral eye. Assessment/Plan:    Acute on chronic diastolic CHF (congestive heart failure): SOB, elevated BNP, pulmonary congestion on CXR; low clinical suspicion for Pneumonia. Flu and COVID negative. She does have known severe AS being medically managed, and suspect decompensated CHF. Continue IV Diuresis.  Monitor I/O, weights, BMP. Cardiology evaluation. She reported that she was currently leaning towards medical management for AS rather than TAVR. Cardiac Arrest: Etiology uncertain. Presumably related to CHF. She was DNR and no resuscitative measures were given according to her wishes. Paroxysmal Atrial Fibrillation: Stable. Continue Eliquis, rate control. COPD (chronic obstructive pulmonary disease): Stable. Continue bronchodilators. No need for steroids. Hypertension, benign: Controlled. Continue current medication regimen. Right Eye Herpes Zoster: She reports she is under treatment from  (records unavailable in [facility]) for the past two weeks. Has had intraoccular injections which have lead to the extensive conjunctival and oz-orbital bruising. She is on a regimen of eye drops along with Valtrex (through 12/8/22). She is also using Erythromycin ointment in the contralateral eye; supposed to use BID but only takes once nightly so she can see during the day. Consults:  IP CONSULT TO HEART FAILURE NURSE/COORDINATOR  IP CONSULT TO DIETITIAN  IP CONSULT TO PODIATRY  IP CONSULT TO CARDIOLOGY    Signed:  Pramod Reyes MD MD   12/29/2022    Thank you Candace Severs for the opportunity to be involved in this patient's care. If you have any questions or concerns, please feel free to contact me at [phone].
